# Patient Record
Sex: FEMALE | Race: WHITE | NOT HISPANIC OR LATINO | Employment: FULL TIME | URBAN - METROPOLITAN AREA
[De-identification: names, ages, dates, MRNs, and addresses within clinical notes are randomized per-mention and may not be internally consistent; named-entity substitution may affect disease eponyms.]

---

## 2023-10-27 ENCOUNTER — TELEPHONE (OUTPATIENT)
Dept: HEMATOLOGY/ONCOLOGY | Facility: CLINIC | Age: 58
End: 2023-10-27
Payer: COMMERCIAL

## 2023-11-01 PROBLEM — H52.4 BILATERAL PRESBYOPIA: Status: ACTIVE | Noted: 2023-11-01

## 2023-11-01 PROBLEM — M41.20 IDIOPATHIC SCOLIOSIS AND KYPHOSCOLIOSIS: Status: ACTIVE | Noted: 2023-11-01

## 2023-11-01 PROBLEM — M48.062 NEUROGENIC CLAUDICATION DUE TO LUMBAR SPINAL STENOSIS: Status: ACTIVE | Noted: 2023-11-01

## 2023-11-01 PROBLEM — E11.9 DIABETES MELLITUS TYPE 2 WITHOUT RETINOPATHY (MULTI): Status: ACTIVE | Noted: 2023-11-01

## 2023-11-01 PROBLEM — Q67.5 SCOLIOSIS, CONGENITAL: Status: ACTIVE | Noted: 2023-11-01

## 2023-11-01 PROBLEM — H52.13 BILATERAL MYOPIA: Status: ACTIVE | Noted: 2023-11-01

## 2023-11-01 PROBLEM — M43.10 SPONDYLOLISTHESIS, GRADE 1: Status: ACTIVE | Noted: 2023-11-01

## 2023-11-01 RX ORDER — GABAPENTIN 300 MG/1
300 CAPSULE ORAL 2 TIMES DAILY
COMMUNITY
Start: 2023-08-08

## 2023-11-01 RX ORDER — AZELASTINE HYDROCHLORIDE 0.5 MG/ML
SOLUTION/ DROPS OPHTHALMIC
COMMUNITY
Start: 2023-03-02 | End: 2023-11-30 | Stop reason: HOSPADM

## 2023-11-01 RX ORDER — POTASSIUM CHLORIDE 750 MG/1
10 TABLET, EXTENDED RELEASE ORAL DAILY
COMMUNITY
Start: 2023-08-08

## 2023-11-01 RX ORDER — DULOXETIN HYDROCHLORIDE 60 MG/1
60 CAPSULE, DELAYED RELEASE ORAL DAILY
COMMUNITY
Start: 2023-08-09

## 2023-11-01 RX ORDER — VALSARTAN AND HYDROCHLOROTHIAZIDE 160; 12.5 MG/1; MG/1
1 TABLET, FILM COATED ORAL DAILY
COMMUNITY
Start: 2023-08-15

## 2023-11-01 RX ORDER — BLOOD-GLUCOSE METER
KIT MISCELLANEOUS
COMMUNITY
Start: 2023-09-01

## 2023-11-01 RX ORDER — MONTELUKAST SODIUM 10 MG/1
10 TABLET ORAL NIGHTLY
Status: ON HOLD | COMMUNITY
Start: 2023-10-22 | End: 2023-11-30 | Stop reason: SDUPTHER

## 2023-11-01 RX ORDER — AMLODIPINE BESYLATE 10 MG/1
10 TABLET ORAL DAILY
COMMUNITY
Start: 2023-08-15

## 2023-11-01 RX ORDER — IBUPROFEN 800 MG/1
TABLET ORAL
Status: ON HOLD | COMMUNITY
Start: 2023-04-24 | End: 2023-11-30 | Stop reason: SDUPTHER

## 2023-11-01 RX ORDER — ZOLPIDEM TARTRATE 12.5 MG/1
12.5 TABLET, FILM COATED, EXTENDED RELEASE ORAL NIGHTLY PRN
COMMUNITY

## 2023-11-01 RX ORDER — SEMAGLUTIDE 2.68 MG/ML
2 INJECTION, SOLUTION SUBCUTANEOUS
COMMUNITY
Start: 2023-08-25

## 2023-11-02 ENCOUNTER — OFFICE VISIT (OUTPATIENT)
Dept: NEUROSURGERY | Facility: CLINIC | Age: 58
End: 2023-11-02
Payer: COMMERCIAL

## 2023-11-02 VITALS — HEART RATE: 90 BPM | SYSTOLIC BLOOD PRESSURE: 124 MMHG | TEMPERATURE: 97.7 F | DIASTOLIC BLOOD PRESSURE: 82 MMHG

## 2023-11-02 DIAGNOSIS — M48.062 NEUROGENIC CLAUDICATION DUE TO LUMBAR SPINAL STENOSIS: Primary | ICD-10-CM

## 2023-11-02 DIAGNOSIS — Q67.5 SCOLIOSIS, CONGENITAL: ICD-10-CM

## 2023-11-02 DIAGNOSIS — M41.20 IDIOPATHIC SCOLIOSIS AND KYPHOSCOLIOSIS: ICD-10-CM

## 2023-11-02 PROCEDURE — 99215 OFFICE O/P EST HI 40 MIN: CPT | Performed by: NEUROLOGICAL SURGERY

## 2023-11-02 PROCEDURE — 3079F DIAST BP 80-89 MM HG: CPT | Performed by: NEUROLOGICAL SURGERY

## 2023-11-02 PROCEDURE — 3074F SYST BP LT 130 MM HG: CPT | Performed by: NEUROLOGICAL SURGERY

## 2023-11-02 PROCEDURE — 1036F TOBACCO NON-USER: CPT | Performed by: NEUROLOGICAL SURGERY

## 2023-11-02 ASSESSMENT — PATIENT HEALTH QUESTIONNAIRE - PHQ9
2. FEELING DOWN, DEPRESSED OR HOPELESS: NOT AT ALL
1. LITTLE INTEREST OR PLEASURE IN DOING THINGS: SEVERAL DAYS
SUM OF ALL RESPONSES TO PHQ9 QUESTIONS 1 AND 2: 1

## 2023-11-02 ASSESSMENT — ENCOUNTER SYMPTOMS: OCCASIONAL FEELINGS OF UNSTEADINESS: 0

## 2023-11-02 ASSESSMENT — PAIN SCALES - GENERAL: PAINLEVEL: 7

## 2023-11-02 NOTE — PROGRESS NOTES
[facility] [place]  Department of Neurological Surgery  Established Patient Visit    Regardless of what the note states the patient's pain is predominantly on the right side and that is where most of the focus will occur at both L4-5 and L5-S1 but the new left-sided symptoms are coming from the L5-S1 and therefore we will be doing bilateral L5-S1 foraminotomies.    History of Present Illness  Katlyn Jeff is a 58 y.o. year old female who presents to the spine clinic in follow up with her new MRI of the lumbar spine to discuss her options for surgical intervention.  She has been complaining of significant right leg pain with walking and standing for quite some time.  We done therapy.  She is lost weight.  She has had injections.  It is getting to the point where she cannot take it any longer.  We have a CAT scan from 2021 which shows us that the disc osteophyte complexes on the MRI are in fact calcified.  Because her pain is predominantly on the right side but she does have some  numbness developing on the left my recommendation is to perform a decompressive lumbar laminectomy at L4-5 and L5-S1.  The decompression of L4 would go all the way up to identifying the takeoff of the L4 nerve roots and opening up the foramina especially on the left side.  At L5-S1 she has bilateral stenosis and the S1 level is worse on the left.  I went over the decompressive laminectomy with foraminotomies with her today.  I went over the operation with her in detail. We discussed risks,(these include but are not limited to - bleeding, infection, paralysis, muscle weakness, CSF leak, bowel or bladder dysfunction, incomplete resolution of pain or numbness, DVT/PE, heart attack, stroke, and other unforeseen medical and anesthesia complications) benefits, and outcome possibilities as well as the alternatives to this form of therapy. She understands and would like to proceed.    Patient's BMI is There is no height or weight  on file to calculate BMI.    14/14 systems reviewed and negative other than what is listed in the history of present illness    Patient Active Problem List   Diagnosis    Bilateral myopia    Bilateral presbyopia    Diabetes mellitus type 2 without retinopathy (CMS/HCC)    Neurogenic claudication due to lumbar spinal stenosis    Idiopathic scoliosis and kyphoscoliosis    Spondylolisthesis, grade 1    Scoliosis, congenital     Past Medical History:   Diagnosis Date    Personal history of other diseases of the circulatory system     History of hypertension    Personal history of other diseases of the musculoskeletal system and connective tissue     History of scoliosis    Personal history of other diseases of the musculoskeletal system and connective tissue     History of arthritis    Personal history of other endocrine, nutritional and metabolic disease     History of hypercholesterolemia    Personal history of other infectious and parasitic diseases     History of hepatitis C virus infection    Personal history of other mental and behavioral disorders     History of depression    Type 2 diabetes mellitus without complications (CMS/HCC)     Diabetes type 2, controlled     Past Surgical History:   Procedure Laterality Date    OTHER SURGICAL HISTORY  10/17/2022    Ankle surgery    OTHER SURGICAL HISTORY  10/17/2022    Cervical vertebral fusion     Social History     Tobacco Use    Smoking status: Never    Smokeless tobacco: Never   Substance Use Topics    Alcohol use: Yes     Comment: Rarely     family history includes Chronic primary angle-closure of right eye, moderate stage in her mother; Diabetes in her mother and another family member; Hypertension in her mother; Stomach cancer in her father, maternal grandmother, and paternal grandmother.    Current Outpatient Medications:     amLODIPine (Norvasc) 10 mg tablet, Take 1 tablet (10 mg) by mouth once daily., Disp: , Rfl:     azelastine (Optivar) 0.05 % ophthalmic  solution, , Disp: , Rfl:     DULoxetine (Cymbalta) 60 mg DR capsule, Take 1 capsule (60 mg) by mouth once daily., Disp: , Rfl:     FreeStyle Lite Strips strip, USE TO TEST EVERY DAY, Disp: , Rfl:     gabapentin (Neurontin) 300 mg capsule, Take 1 capsule (300 mg) by mouth 2 times a day., Disp: , Rfl:     ibuprofen 800 mg tablet, , Disp: , Rfl:     montelukast (Singulair) 10 mg tablet, Take 1 tablet (10 mg) by mouth once daily at bedtime., Disp: , Rfl:     Ozempic 2 mg/dose (8 mg/3 mL) pen injector, Inject 2 mg under the skin 1 (one) time per week., Disp: , Rfl:     potassium chloride CR 10 mEq ER tablet, Take 1 tablet (10 mEq) by mouth once daily., Disp: , Rfl:     valsartan-hydrochlorothiazide (Diovan-HCT) 160-12.5 mg tablet, 1 tablet once daily., Disp: , Rfl:     zolpidem CR (Ambien CR) 12.5 mg ER tablet, Take 1 tablet (12.5 mg) by mouth as needed at bedtime for sleep. Do not crush, chew, or split., Disp: , Rfl:   Allergies   Allergen Reactions    Adhesive Tape-Silicones Unknown    Aloe Vera Latex Gloves [Gloves, Latex With Aloe Vera] Unknown     Latex gloves     Codeine Unknown    Macrolide Antibiotics Unknown       Physical Examination:    Her gait today is nonantalgic and she has good strength in her legs including her anterior tibialis but her EHL is slightly weak on the right side.    Results:  I personally reviewed and interpreted the imaging results which included the new MRI shows that she has some lateral recess stenosis on the left side at L3-4 which we will not be addressing.  At L4-5 she has a large disc osteophyte complex underneath the thecal sac over to the right side and severe foraminal stenosis at L4.  The L5-S1 level have bilateral foraminal stenosis and the S1 nerve root is compressed more significantly on the left side    Assessment and Plan:      Katlyn Jeff is a 58 y.o. year old female who presents to the spine clinic in follow up with significant lumbar canal stenosis at L4-5 and L5-S1  secondary to degenerative changes associated with her scoliosis fusion from decades earlier.  She is gotten to the point where her therapy and her injections have stopped being effective and she would like to consider surgical intervention.  I am recommending a decompressive lumbar laminectomy at L4-5 and L5-S1 with foraminotomies and removal of any calcified disc that is readily removable.  I went over the operation with her in detail. We discussed risks,(these include but are not limited to - bleeding, infection, paralysis, muscle weakness, CSF leak, bowel or bladder dysfunction, incomplete resolution of pain or numbness, DVT/PE, heart attack, stroke, and other unforeseen medical and anesthesia complications) benefits, and outcome possibilities as well as the alternatives to this form of therapy. She understands and would like to proceed.      I have reviewed all prior documentation and reviewed the electronic medical record since admission. I have personally have reviewed all advanced imaging not just the reports and used my interpretation as documented as the relevant findings. I have reviewed the risks and benefits of all treatment recommendations listed in this note with the patient and family. I spent a total of 50 minutes in service to this patient's care during this date of service.      The above clinical summary has been dictated with voice recognition software. It has not been proofread for grammatical errors, typographical mistakes, or other semantic inconsistencies.    Thank you for visiting our office today. It was our pleasure to take part in your healthcare.     Do not hesitate to call with any questions regarding your plan of care after leaving. My office can be reached at [phone] M-F 8am-4pm.     To clinicians, thank you very much for this kind referral. It is a privilege to partner with you in the care of your patients. My office would be delighted to assist you with any further  consultations or with questions regarding the plan of care outlined. Do not hesitate to call the office or contact me directly.     Sincerely,    Saji Echavarria MD, FAANS, FACS  Board Certified Neurological Surgeon  , Department of Neurological Surgery  [employer]    [facility]  [address]  [address] [facility] [address]  [place] [address]    [facility]  [address]  [address]  [place] [address]    Phone: [phone]  Fax: [phone]

## 2023-11-02 NOTE — H&P (VIEW-ONLY)
[facility] [place]  Department of Neurological Surgery  Established Patient Visit    Regardless of what the note states the patient's pain is predominantly on the right side and that is where most of the focus will occur at both L4-5 and L5-S1 but the new left-sided symptoms are coming from the L5-S1 and therefore we will be doing bilateral L5-S1 foraminotomies.    History of Present Illness  Katlyn Jeff is a 58 y.o. year old female who presents to the spine clinic in follow up with her new MRI of the lumbar spine to discuss her options for surgical intervention.  She has been complaining of significant right leg pain with walking and standing for quite some time.  We done therapy.  She is lost weight.  She has had injections.  It is getting to the point where she cannot take it any longer.  We have a CAT scan from 2021 which shows us that the disc osteophyte complexes on the MRI are in fact calcified.  Because her pain is predominantly on the right side but she does have some  numbness developing on the left my recommendation is to perform a decompressive lumbar laminectomy at L4-5 and L5-S1.  The decompression of L4 would go all the way up to identifying the takeoff of the L4 nerve roots and opening up the foramina especially on the left side.  At L5-S1 she has bilateral stenosis and the S1 level is worse on the left.  I went over the decompressive laminectomy with foraminotomies with her today.  I went over the operation with her in detail. We discussed risks,(these include but are not limited to - bleeding, infection, paralysis, muscle weakness, CSF leak, bowel or bladder dysfunction, incomplete resolution of pain or numbness, DVT/PE, heart attack, stroke, and other unforeseen medical and anesthesia complications) benefits, and outcome possibilities as well as the alternatives to this form of therapy. She understands and would like to proceed.    Patient's BMI is There is no height or weight  on file to calculate BMI.    14/14 systems reviewed and negative other than what is listed in the history of present illness    Patient Active Problem List   Diagnosis    Bilateral myopia    Bilateral presbyopia    Diabetes mellitus type 2 without retinopathy (CMS/HCC)    Neurogenic claudication due to lumbar spinal stenosis    Idiopathic scoliosis and kyphoscoliosis    Spondylolisthesis, grade 1    Scoliosis, congenital     Past Medical History:   Diagnosis Date    Personal history of other diseases of the circulatory system     History of hypertension    Personal history of other diseases of the musculoskeletal system and connective tissue     History of scoliosis    Personal history of other diseases of the musculoskeletal system and connective tissue     History of arthritis    Personal history of other endocrine, nutritional and metabolic disease     History of hypercholesterolemia    Personal history of other infectious and parasitic diseases     History of hepatitis C virus infection    Personal history of other mental and behavioral disorders     History of depression    Type 2 diabetes mellitus without complications (CMS/HCC)     Diabetes type 2, controlled     Past Surgical History:   Procedure Laterality Date    OTHER SURGICAL HISTORY  10/17/2022    Ankle surgery    OTHER SURGICAL HISTORY  10/17/2022    Cervical vertebral fusion     Social History     Tobacco Use    Smoking status: Never    Smokeless tobacco: Never   Substance Use Topics    Alcohol use: Yes     Comment: Rarely     family history includes Chronic primary angle-closure of right eye, moderate stage in her mother; Diabetes in her mother and another family member; Hypertension in her mother; Stomach cancer in her father, maternal grandmother, and paternal grandmother.    Current Outpatient Medications:     amLODIPine (Norvasc) 10 mg tablet, Take 1 tablet (10 mg) by mouth once daily., Disp: , Rfl:     azelastine (Optivar) 0.05 % ophthalmic  solution, , Disp: , Rfl:     DULoxetine (Cymbalta) 60 mg DR capsule, Take 1 capsule (60 mg) by mouth once daily., Disp: , Rfl:     FreeStyle Lite Strips strip, USE TO TEST EVERY DAY, Disp: , Rfl:     gabapentin (Neurontin) 300 mg capsule, Take 1 capsule (300 mg) by mouth 2 times a day., Disp: , Rfl:     ibuprofen 800 mg tablet, , Disp: , Rfl:     montelukast (Singulair) 10 mg tablet, Take 1 tablet (10 mg) by mouth once daily at bedtime., Disp: , Rfl:     Ozempic 2 mg/dose (8 mg/3 mL) pen injector, Inject 2 mg under the skin 1 (one) time per week., Disp: , Rfl:     potassium chloride CR 10 mEq ER tablet, Take 1 tablet (10 mEq) by mouth once daily., Disp: , Rfl:     valsartan-hydrochlorothiazide (Diovan-HCT) 160-12.5 mg tablet, 1 tablet once daily., Disp: , Rfl:     zolpidem CR (Ambien CR) 12.5 mg ER tablet, Take 1 tablet (12.5 mg) by mouth as needed at bedtime for sleep. Do not crush, chew, or split., Disp: , Rfl:   Allergies   Allergen Reactions    Adhesive Tape-Silicones Unknown    Aloe Vera Latex Gloves [Gloves, Latex With Aloe Vera] Unknown     Latex gloves     Codeine Unknown    Macrolide Antibiotics Unknown       Physical Examination:    Her gait today is nonantalgic and she has good strength in her legs including her anterior tibialis but her EHL is slightly weak on the right side.    Results:  I personally reviewed and interpreted the imaging results which included the new MRI shows that she has some lateral recess stenosis on the left side at L3-4 which we will not be addressing.  At L4-5 she has a large disc osteophyte complex underneath the thecal sac over to the right side and severe foraminal stenosis at L4.  The L5-S1 level have bilateral foraminal stenosis and the S1 nerve root is compressed more significantly on the left side    Assessment and Plan:      Katlyn Jeff is a 58 y.o. year old female who presents to the spine clinic in follow up with significant lumbar canal stenosis at L4-5 and L5-S1  secondary to degenerative changes associated with her scoliosis fusion from decades earlier.  She is gotten to the point where her therapy and her injections have stopped being effective and she would like to consider surgical intervention.  I am recommending a decompressive lumbar laminectomy at L4-5 and L5-S1 with foraminotomies and removal of any calcified disc that is readily removable.  I went over the operation with her in detail. We discussed risks,(these include but are not limited to - bleeding, infection, paralysis, muscle weakness, CSF leak, bowel or bladder dysfunction, incomplete resolution of pain or numbness, DVT/PE, heart attack, stroke, and other unforeseen medical and anesthesia complications) benefits, and outcome possibilities as well as the alternatives to this form of therapy. She understands and would like to proceed.      I have reviewed all prior documentation and reviewed the electronic medical record since admission. I have personally have reviewed all advanced imaging not just the reports and used my interpretation as documented as the relevant findings. I have reviewed the risks and benefits of all treatment recommendations listed in this note with the patient and family. I spent a total of 50 minutes in service to this patient's care during this date of service.      The above clinical summary has been dictated with voice recognition software. It has not been proofread for grammatical errors, typographical mistakes, or other semantic inconsistencies.    Thank you for visiting our office today. It was our pleasure to take part in your healthcare.     Do not hesitate to call with any questions regarding your plan of care after leaving. My office can be reached at [phone] M-F 8am-4pm.     To clinicians, thank you very much for this kind referral. It is a privilege to partner with you in the care of your patients. My office would be delighted to assist you with any further  consultations or with questions regarding the plan of care outlined. Do not hesitate to call the office or contact me directly.     Sincerely,    Saji Echavarria MD, FAANS, FACS  Board Certified Neurological Surgeon  , Department of Neurological Surgery  [facility]    [facility]  [address]  [address] [facility] [address]  [place] [address]    [facility]  [address]  [address]  [place] [address]    Phone: [phone]  Fax: [phone]

## 2023-11-03 DIAGNOSIS — M48.062 NEUROGENIC CLAUDICATION DUE TO LUMBAR SPINAL STENOSIS: Primary | ICD-10-CM

## 2023-11-03 RX ORDER — DEXTROSE, SODIUM CHLORIDE, SODIUM LACTATE, POTASSIUM CHLORIDE, AND CALCIUM CHLORIDE 5; .6; .31; .03; .02 G/100ML; G/100ML; G/100ML; G/100ML; G/100ML
100 INJECTION, SOLUTION INTRAVENOUS CONTINUOUS
Status: CANCELLED | OUTPATIENT
Start: 2023-11-03

## 2023-11-22 ENCOUNTER — HOSPITAL ENCOUNTER (OUTPATIENT)
Dept: RADIOLOGY | Facility: HOSPITAL | Age: 58
Discharge: HOME | End: 2023-11-22
Payer: COMMERCIAL

## 2023-11-22 ENCOUNTER — PRE-ADMISSION TESTING (OUTPATIENT)
Dept: PREADMISSION TESTING | Facility: HOSPITAL | Age: 58
End: 2023-11-22
Payer: COMMERCIAL

## 2023-11-22 VITALS
DIASTOLIC BLOOD PRESSURE: 75 MMHG | OXYGEN SATURATION: 98 % | HEART RATE: 99 BPM | WEIGHT: 182.98 LBS | HEIGHT: 63 IN | SYSTOLIC BLOOD PRESSURE: 107 MMHG | TEMPERATURE: 97 F | BODY MASS INDEX: 32.42 KG/M2 | RESPIRATION RATE: 16 BRPM

## 2023-11-22 DIAGNOSIS — M48.062 NEUROGENIC CLAUDICATION DUE TO LUMBAR SPINAL STENOSIS: ICD-10-CM

## 2023-11-22 LAB
ANION GAP SERPL CALC-SCNC: 15 MMOL/L (ref 10–20)
APTT PPP: 34 SECONDS (ref 27–38)
BUN SERPL-MCNC: 18 MG/DL (ref 6–23)
CALCIUM SERPL-MCNC: 10.2 MG/DL (ref 8.6–10.3)
CHLORIDE SERPL-SCNC: 100 MMOL/L (ref 98–107)
CO2 SERPL-SCNC: 28 MMOL/L (ref 21–32)
CREAT SERPL-MCNC: 0.97 MG/DL (ref 0.5–1.05)
ERYTHROCYTE [DISTWIDTH] IN BLOOD BY AUTOMATED COUNT: 12.3 % (ref 11.5–14.5)
GFR SERPL CREATININE-BSD FRML MDRD: 67 ML/MIN/1.73M*2
GLUCOSE SERPL-MCNC: 108 MG/DL (ref 74–99)
HCT VFR BLD AUTO: 43.8 % (ref 36–46)
HGB BLD-MCNC: 14.4 G/DL (ref 12–16)
INR PPP: 1 (ref 0.9–1.1)
MCH RBC QN AUTO: 31 PG (ref 26–34)
MCHC RBC AUTO-ENTMCNC: 32.9 G/DL (ref 32–36)
MCV RBC AUTO: 94 FL (ref 80–100)
NRBC BLD-RTO: 0 /100 WBCS (ref 0–0)
PLATELET # BLD AUTO: 387 X10*3/UL (ref 150–450)
POTASSIUM SERPL-SCNC: 4.9 MMOL/L (ref 3.5–5.3)
PROTHROMBIN TIME: 11.7 SECONDS (ref 9.8–12.8)
RBC # BLD AUTO: 4.64 X10*6/UL (ref 4–5.2)
SODIUM SERPL-SCNC: 138 MMOL/L (ref 136–145)
WBC # BLD AUTO: 7.4 X10*3/UL (ref 4.4–11.3)

## 2023-11-22 PROCEDURE — 87081 CULTURE SCREEN ONLY: CPT | Mod: PARLAB | Performed by: NURSE PRACTITIONER

## 2023-11-22 PROCEDURE — 80048 BASIC METABOLIC PNL TOTAL CA: CPT | Performed by: NEUROLOGICAL SURGERY

## 2023-11-22 PROCEDURE — 93010 ELECTROCARDIOGRAM REPORT: CPT | Performed by: INTERNAL MEDICINE

## 2023-11-22 PROCEDURE — 85027 COMPLETE CBC AUTOMATED: CPT | Performed by: NEUROLOGICAL SURGERY

## 2023-11-22 PROCEDURE — 36415 COLL VENOUS BLD VENIPUNCTURE: CPT | Performed by: NEUROLOGICAL SURGERY

## 2023-11-22 PROCEDURE — 85610 PROTHROMBIN TIME: CPT | Performed by: NEUROLOGICAL SURGERY

## 2023-11-22 PROCEDURE — 93005 ELECTROCARDIOGRAM TRACING: CPT

## 2023-11-22 PROCEDURE — 71046 X-RAY EXAM CHEST 2 VIEWS: CPT | Performed by: RADIOLOGY

## 2023-11-22 PROCEDURE — 71046 X-RAY EXAM CHEST 2 VIEWS: CPT

## 2023-11-22 PROCEDURE — 99203 OFFICE O/P NEW LOW 30 MIN: CPT | Performed by: NURSE PRACTITIONER

## 2023-11-22 ASSESSMENT — DUKE ACTIVITY SCORE INDEX (DASI)
CAN YOU TAKE CARE OF YOURSELF (EAT, DRESS, BATHE, OR USE TOILET): YES
CAN YOU DO LIGHT WORK AROUND THE HOUSE LIKE DUSTING OR WASHING DISHES: YES
CAN YOU DO YARD WORK LIKE RAKING LEAVES, WEEDING OR PUSHING A MOWER: NO
CAN YOU CLIMB A FLIGHT OF STAIRS OR WALK UP A HILL: YES
CAN YOU WALK A BLOCK OR TWO ON LEVEL GROUND: YES
CAN YOU PARTICIPATE IN MODERATE RECREATIONAL ACTIVITIES LIKE GOLF, BOWLING, DANCING, DOUBLES TENNIS OR THROWING A BASEBALL OR FOOTBALL: NO
CAN YOU DO HEAVY WORK AROUND THE HOUSE LIKE SCRUBBING FLOORS OR LIFTING AND MOVING HEAVY FURNITURE: NO
CAN YOU DO MODERATE WORK AROUND THE HOUSE LIKE VACUUMING, SWEEPING FLOORS OR CARRYING GROCERIES: YES
CAN YOU HAVE SEXUAL RELATIONS: YES
TOTAL_SCORE: 24.2
DASI METS SCORE: 5.7
CAN YOU PARTICIPATE IN STRENOUS SPORTS LIKE SWIMMING, SINGLES TENNIS, FOOTBALL, BASKETBALL, OR SKIING: NO
CAN YOU WALK INDOORS, SUCH AS AROUND YOUR HOUSE: YES
CAN YOU RUN A SHORT DISTANCE: NO

## 2023-11-22 NOTE — H&P (VIEW-ONLY)
CPM/PAT Evaluation       Name: Katlyn Jeff (Katlyn Jeff)  /Age: 1965/58 y.o.     In-Person       Chief Complaint: Spinal stenosis    58 yr old female with c/o lower back pain.  Reports ongoing progressive pain worsened by activity throughout the day including walking, standing and sitting in chair.  Pain is constant ache radiating from lower back down Right leg with varying of intensity; denies numbness/tingling or related falls.  Has tried injections, stretches, water aerobics with no lasting relief.  Reports remaining otherwise physically active as much as can be, denies cardiac or respiratory symptoms.  Reports hx of post-op tremors. Reports hardware (two Blanco rods) in spine from prior surgery.         Past Medical History:   Diagnosis Date    Arthritis     Dysfunctional uterine bleeding     GERD (gastroesophageal reflux disease)     Hepatitis C     History of blood transfusion     HPV in female     Hypertension     Nephrolithiasis     Personal history of other diseases of the circulatory system     History of hypertension    Personal history of other diseases of the musculoskeletal system and connective tissue     History of scoliosis    Personal history of other diseases of the musculoskeletal system and connective tissue     History of arthritis    Personal history of other endocrine, nutritional and metabolic disease     History of hypercholesterolemia    Personal history of other infectious and parasitic diseases     History of hepatitis C virus infection    Personal history of other mental and behavioral disorders     History of depression    Scoliosis     Type 2 diabetes mellitus without complications (CMS/HCC)     Diabetes type 2, controlled    Vision loss        Past Surgical History:   Procedure Laterality Date    CHOLECYSTECTOMY      COLONOSCOPY      OTHER SURGICAL HISTORY  10/17/2022    Ankle surgery    OTHER SURGICAL HISTORY  10/17/2022    Cervical vertebral fusion    SPINAL FUSION       TONSILLECTOMY      UPPER GASTROINTESTINAL ENDOSCOPY         Patient  has no history on file for sexual activity.    Family History   Problem Relation Name Age of Onset    Other (Chronic primary angle-closure of right eye, moderate stage) Mother      Diabetes Mother      Hypertension Mother      Stomach cancer Father      Stomach cancer Maternal Grandmother      Stomach cancer Paternal Grandmother      Diabetes Other Maternal relatives        Allergies   Allergen Reactions    Adhesive Tape-Silicones Unknown    Aloe Vera Latex Gloves [Gloves, Latex With Aloe Vera] Unknown     Latex gloves     Codeine Unknown    Macrolide Antibiotics Unknown       Prior to Admission medications    Medication Sig Start Date End Date Taking? Authorizing Provider   amLODIPine (Norvasc) 10 mg tablet Take 1 tablet (10 mg) by mouth once daily. 8/15/23  Yes Historical Provider, MD   DULoxetine (Cymbalta) 60 mg DR capsule Take 1 capsule (60 mg) by mouth once daily. 8/9/23  Yes Historical Provider, MD   gabapentin (Neurontin) 300 mg capsule Take 1 capsule (300 mg) by mouth 2 times a day. 8/8/23  Yes Historical Provider, MD   ibuprofen 800 mg tablet  4/24/23  Yes Historical Provider, MD   montelukast (Singulair) 10 mg tablet Take 1 tablet (10 mg) by mouth once daily at bedtime. 10/22/23  Yes Historical Provider, MD   Ozempic 2 mg/dose (8 mg/3 mL) pen injector Inject 2 mg under the skin 1 (one) time per week. 8/25/23  Yes Historical Provider, MD   potassium chloride CR 10 mEq ER tablet Take 1 tablet (10 mEq) by mouth once daily. 8/8/23  Yes Historical Provider, MD   valsartan-hydrochlorothiazide (Diovan-HCT) 160-12.5 mg tablet 1 tablet once daily. 8/15/23  Yes Historical Provider, MD   zolpidem CR (Ambien CR) 12.5 mg ER tablet Take 1 tablet (12.5 mg) by mouth as needed at bedtime for sleep. Do not crush, chew, or split.   Yes Historical Provider, MD   azelastine (Optivar) 0.05 % ophthalmic solution  3/2/23   Historical Provider, MD    FreeStyle Lite Strips strip USE TO TEST EVERY DAY 9/1/23   Historical Provider, MD        Review of Systems  Constitutional: no fever, no chills and not feeling poorly.   Eyes: no eyesight problems.   ENT: some buzzing in ears; no hearing loss, no nosebleeds and no sore throat.   Cardiovascular: no chest pain, no palpitations and no extremity edema.   Respiratory: no shortness of breath, no wheezing, no cough and no shortness of breath during exertion.   Gastrointestinal: no abdominal pain, no constipation, no heartburn, no diarrhea, no vomiting and no blood in stools.   Genitourinary: no dysuria, no incontinence, normal micturition and no testicular pain.   Musculoskeletal: no arthralgias and no gait abnormality.   Integumentary: no skin lesions, no skin wound and no change in a mole.   Neurological: no confusion, no dizziness, no fainting and no difficulty walking.   Psychiatric: not suicidal, no anxiety and no depression.   All other systems have been reviewed and are negative for complaint.     Physical Exam  Constitutional:       General: No acute distress.     Aox3, pleasant and cooperative, appropriate mood and eye contact   HENT:      Head: Normocephalic.      Mouth/Throat: Mucous membranes moist & pink  Eyes:      Vision grossly intact, PERRLA, corrective lenses in use   Neck:      No carotid bruit, no JVD  Cardiovascular:      RRR, S1S2, no murmurs, rubs or gallops  Pulmonary:      Symmetric chest expansion, CTA, Room Air  Abdominal:      Soft non-tender, BSx4   Skin:     Warm, dry & intact   Extremities:      No gross deformities; normal gait   Neurological:      No focal deficit, Aox3, JOHNSON x4  Psychiatric:      Pleasant & cooperative, appropriate affect    PAT AIRWAY:   Airway:     Mallampati::  I    TM distance::  <3 FB    Neck ROM::  Limited   Top front veneers  Crowns in back bottom      Visit Vitals  /75   Pulse 99   Temp 36.1 °C (97 °F)   Resp 16       DASI Risk Score      Flowsheet Row Most  Recent Value   DASI SCORE 24.2   METS Score (Will be calculated only when all the questions are answered) 5.7          Caprini DVT Assessment    No data to display       Modified Frailty Index    No data to display       CHADS2 Stroke Risk  Current as of 14 minutes ago        N/A 3 - 100%: High Risk   2 - 3%: Medium Risk   0 - 2%: Low Risk     Last Change: N/A          This score determines the patient's risk of having a stroke if the patient has atrial fibrillation.        This score is not applicable to this patient. Components are not calculated.          Revised Cardiac Risk Index    No data to display       Apfel Simplified Score    No data to display       Risk Analysis Index Results This Encounter    No data found in the last 1 encounters.       Stop Bang Score      Flowsheet Row Most Recent Value   Do you snore loudly? 0   Do you often feel tired or fatigued after your sleep? 0   Has anyone ever observed you stop breathing in your sleep? 0   Do you have or are you being treated for high blood pressure? 1   Recent BMI (Calculated) 32.4   Is BMI greater than 35 kg/m2? 0=No   Age older than 50 years old? 1=Yes   Is your neck circumference greater than 17 inches (Male) or 16 inches (Female)? 1   Gender - Male 0=No   STOP-BANG Total Score 3            Assessment and Plan:     Followed by neurosurgery, neurogenic claudication d/t spinal stenosis    L4-5, L-5, S-1 decompressive lumbar laminectomy w/flat plate x-ray w/Dr Echavarria on 11/29/2023

## 2023-11-22 NOTE — PREPROCEDURE INSTRUCTIONS
Medication List            Accurate as of November 22, 2023 11:25 AM. Always use your most recent med list.                amLODIPine 10 mg tablet  Commonly known as: Norvasc  Medication Adjustments for Surgery: Take morning of surgery with sip of water, no other fluids     azelastine 0.05 % ophthalmic solution  Commonly known as: Optivar     DULoxetine 60 mg DR capsule  Commonly known as: Cymbalta     FreeStyle Lite Strips strip  Generic drug: blood sugar diagnostic     gabapentin 300 mg capsule  Commonly known as: Neurontin     ibuprofen 800 mg tablet  Medication Adjustments for Surgery: Stop 7 days before surgery     montelukast 10 mg tablet  Commonly known as: Singulair     Ozempic 2 mg/dose (8 mg/3 mL) pen injector  Generic drug: semaglutide  Medication Adjustments for Surgery: Other (Comment)     potassium chloride CR 10 mEq ER tablet  Commonly known as: Klor-Con     valsartan-hydrochlorothiazide 160-12.5 mg tablet  Commonly known as: Diovan-HCT  Medication Adjustments for Surgery: Take morning of surgery with sip of water, no other fluids     zolpidem CR 12.5 mg ER tablet  Commonly known as: Ambien CR          ERSA KIT AND INSTRUCTIONS PROVIDED    One of our staff members will call you one business day before your surgery between 12-4pm to let you know the time to arrive at the hospital. If you have not received a phone call by 2pm call [phone].     When you arrive at the hospital, go to Registration on the ground floor.   You will need a responsible adult to drive you home.     Prior to surgery date:  One (1) week prior to surgery STOP:  -Stop aspirin products. Do not take NSAIDS/ Ibuprofen, Aleve, Motrin. Okay to take Tylenol or Acetaminophen. Do not take vitamins, supplements, herbals, diet pills.   -Stop these medications: __________________________________________________________  -No alcohol for 24 hours prior to surgery.  -No smoking 24 hours prior. No Marijuana, CBD oil, or Vaping 48 hours  prior to surgery.  -Enjoy a light supper the evening before surgery.    Day of Surgery:  -Nothing to eat or drink after midnight. This includes food of any kind (including hard candy, cough drops, mints and gum, coffee).   -No acrylic nails or nail polish on at least one fingernail; no polish on toes for foot surgery.   -No body piercing or jewelry.   -Shower as directed. No deodorant, lotion, power, oils, perfume, make-up.   -Wear loose comfortable clothing to accommodate your bandages.     -Take the following medication with a very small amount of water the morning of surgery:  TAKE VALSARTAN AND AMLODIPINE WITH SIPS OF WATER.     -Bring crutches/ walker if directed        NPO Instructions:    Do not eat any food after midnight the night before your surgery/procedure.    Additional Instructions:     You will be contacted regarding the time of your arrival to facility and surgery time

## 2023-11-24 LAB — STAPHYLOCOCCUS SPEC CULT: ABNORMAL

## 2023-11-25 LAB
ATRIAL RATE: 91 BPM
P AXIS: 24 DEGREES
P OFFSET: 214 MS
P ONSET: 167 MS
PR INTERVAL: 118 MS
Q ONSET: 226 MS
QRS COUNT: 14 BEATS
QRS DURATION: 92 MS
QT INTERVAL: 368 MS
QTC CALCULATION(BAZETT): 452 MS
QTC FREDERICIA: 423 MS
R AXIS: 39 DEGREES
T AXIS: 41 DEGREES
T OFFSET: 410 MS
VENTRICULAR RATE: 91 BPM

## 2023-11-29 ENCOUNTER — ANESTHESIA (OUTPATIENT)
Dept: OPERATING ROOM | Facility: HOSPITAL | Age: 58
End: 2023-11-29
Payer: COMMERCIAL

## 2023-11-29 ENCOUNTER — APPOINTMENT (OUTPATIENT)
Dept: RADIOLOGY | Facility: HOSPITAL | Age: 58
End: 2023-11-29
Payer: COMMERCIAL

## 2023-11-29 ENCOUNTER — HOSPITAL ENCOUNTER (OUTPATIENT)
Facility: HOSPITAL | Age: 58
Discharge: HOME | End: 2023-11-30
Attending: NEUROLOGICAL SURGERY | Admitting: NEUROLOGICAL SURGERY
Payer: COMMERCIAL

## 2023-11-29 ENCOUNTER — ANESTHESIA EVENT (OUTPATIENT)
Dept: OPERATING ROOM | Facility: HOSPITAL | Age: 58
End: 2023-11-29
Payer: COMMERCIAL

## 2023-11-29 DIAGNOSIS — M48.062 NEUROGENIC CLAUDICATION DUE TO LUMBAR SPINAL STENOSIS: ICD-10-CM

## 2023-11-29 DIAGNOSIS — Z98.890 HISTORY OF GENERAL ANESTHESIA: ICD-10-CM

## 2023-11-29 DIAGNOSIS — Z98.890 HISTORY OF EXCISION OF LAMINA OF LUMBAR VERTEBRA FOR DECOMPRESSION OF SPINAL CORD: ICD-10-CM

## 2023-11-29 DIAGNOSIS — Z01.818 PRE-OP TESTING: Primary | ICD-10-CM

## 2023-11-29 DIAGNOSIS — G89.18 ACUTE POST-OPERATIVE PAIN: ICD-10-CM

## 2023-11-29 LAB
ABO GROUP (TYPE) IN BLOOD: NORMAL
ABO GROUP (TYPE) IN BLOOD: NORMAL
ALBUMIN SERPL BCP-MCNC: 4.1 G/DL (ref 3.4–5)
ALP SERPL-CCNC: 53 U/L (ref 33–136)
ALT SERPL W P-5'-P-CCNC: 10 U/L (ref 7–45)
ANION GAP SERPL CALC-SCNC: 13 MMOL/L (ref 10–20)
ANTIBODY SCREEN: NORMAL
AST SERPL W P-5'-P-CCNC: 15 U/L (ref 9–39)
BILIRUB SERPL-MCNC: 0.7 MG/DL (ref 0–1.2)
BUN SERPL-MCNC: 19 MG/DL (ref 6–23)
CALCIUM SERPL-MCNC: 9.4 MG/DL (ref 8.6–10.3)
CHLORIDE SERPL-SCNC: 103 MMOL/L (ref 98–107)
CO2 SERPL-SCNC: 25 MMOL/L (ref 21–32)
CREAT SERPL-MCNC: 0.73 MG/DL (ref 0.5–1.05)
ERYTHROCYTE [DISTWIDTH] IN BLOOD BY AUTOMATED COUNT: 12.1 % (ref 11.5–14.5)
GFR SERPL CREATININE-BSD FRML MDRD: >90 ML/MIN/1.73M*2
GLUCOSE BLD MANUAL STRIP-MCNC: 118 MG/DL (ref 74–99)
GLUCOSE BLD MANUAL STRIP-MCNC: 137 MG/DL (ref 74–99)
GLUCOSE BLD MANUAL STRIP-MCNC: 142 MG/DL (ref 74–99)
GLUCOSE SERPL-MCNC: 154 MG/DL (ref 74–99)
HCT VFR BLD AUTO: 40.9 % (ref 36–46)
HGB BLD-MCNC: 13.5 G/DL (ref 12–16)
MCH RBC QN AUTO: 31.3 PG (ref 26–34)
MCHC RBC AUTO-ENTMCNC: 33 G/DL (ref 32–36)
MCV RBC AUTO: 95 FL (ref 80–100)
NRBC BLD-RTO: 0 /100 WBCS (ref 0–0)
PLATELET # BLD AUTO: 304 X10*3/UL (ref 150–450)
POTASSIUM SERPL-SCNC: 4 MMOL/L (ref 3.5–5.3)
PROT SERPL-MCNC: 6.4 G/DL (ref 6.4–8.2)
RBC # BLD AUTO: 4.32 X10*6/UL (ref 4–5.2)
RH FACTOR (ANTIGEN D): NORMAL
RH FACTOR (ANTIGEN D): NORMAL
SODIUM SERPL-SCNC: 137 MMOL/L (ref 136–145)
WBC # BLD AUTO: 9.7 X10*3/UL (ref 4.4–11.3)

## 2023-11-29 PROCEDURE — 7100000011 HC EXTENDED STAY RECOVERY HOURLY - NURSING UNIT

## 2023-11-29 PROCEDURE — 2500000004 HC RX 250 GENERAL PHARMACY W/ HCPCS (ALT 636 FOR OP/ED): Performed by: ANESTHESIOLOGY

## 2023-11-29 PROCEDURE — 36620 INSERTION CATHETER ARTERY: CPT | Performed by: NURSE ANESTHETIST, CERTIFIED REGISTERED

## 2023-11-29 PROCEDURE — A4217 STERILE WATER/SALINE, 500 ML: HCPCS

## 2023-11-29 PROCEDURE — 36415 COLL VENOUS BLD VENIPUNCTURE: CPT | Performed by: ANESTHESIOLOGY

## 2023-11-29 PROCEDURE — C1751 CATH, INF, PER/CENT/MIDLINE: HCPCS | Performed by: NEUROLOGICAL SURGERY

## 2023-11-29 PROCEDURE — 2500000004 HC RX 250 GENERAL PHARMACY W/ HCPCS (ALT 636 FOR OP/ED): Performed by: NURSE ANESTHETIST, CERTIFIED REGISTERED

## 2023-11-29 PROCEDURE — 2500000001 HC RX 250 WO HCPCS SELF ADMINISTERED DRUGS (ALT 637 FOR MEDICARE OP): Performed by: NEUROLOGICAL SURGERY

## 2023-11-29 PROCEDURE — 80053 COMPREHEN METABOLIC PANEL: CPT | Performed by: INTERNAL MEDICINE

## 2023-11-29 PROCEDURE — 2780000003 HC OR 278 NO HCPCS: Performed by: NEUROLOGICAL SURGERY

## 2023-11-29 PROCEDURE — 3700000002 HC GENERAL ANESTHESIA TIME - EACH INCREMENTAL 1 MINUTE: Performed by: NEUROLOGICAL SURGERY

## 2023-11-29 PROCEDURE — 2500000004 HC RX 250 GENERAL PHARMACY W/ HCPCS (ALT 636 FOR OP/ED)

## 2023-11-29 PROCEDURE — A63047 PR LAMINEC/FACETECT/FORAMIN,LUMBAR 1 SEG: Performed by: ANESTHESIOLOGY

## 2023-11-29 PROCEDURE — 7100000002 HC RECOVERY ROOM TIME - EACH INCREMENTAL 1 MINUTE: Performed by: NEUROLOGICAL SURGERY

## 2023-11-29 PROCEDURE — 2500000001 HC RX 250 WO HCPCS SELF ADMINISTERED DRUGS (ALT 637 FOR MEDICARE OP): Performed by: ANESTHESIOLOGY

## 2023-11-29 PROCEDURE — 86901 BLOOD TYPING SEROLOGIC RH(D): CPT | Performed by: ANESTHESIOLOGY

## 2023-11-29 PROCEDURE — 2500000005 HC RX 250 GENERAL PHARMACY W/O HCPCS: Performed by: NURSE ANESTHETIST, CERTIFIED REGISTERED

## 2023-11-29 PROCEDURE — 85027 COMPLETE CBC AUTOMATED: CPT | Performed by: INTERNAL MEDICINE

## 2023-11-29 PROCEDURE — 99221 1ST HOSP IP/OBS SF/LOW 40: CPT | Performed by: INTERNAL MEDICINE

## 2023-11-29 PROCEDURE — 7100000001 HC RECOVERY ROOM TIME - INITIAL BASE CHARGE: Performed by: NEUROLOGICAL SURGERY

## 2023-11-29 PROCEDURE — 72020 X-RAY EXAM OF SPINE 1 VIEW
CPT | Mod: REPEAT PROCEDURE BY SAME PHYSICIAN | Performed by: STUDENT IN AN ORGANIZED HEALTH CARE EDUCATION/TRAINING PROGRAM

## 2023-11-29 PROCEDURE — 36415 COLL VENOUS BLD VENIPUNCTURE: CPT | Performed by: NEUROLOGICAL SURGERY

## 2023-11-29 PROCEDURE — 72020 X-RAY EXAM OF SPINE 1 VIEW: CPT | Mod: 76,FY

## 2023-11-29 PROCEDURE — A63047 PR LAMINEC/FACETECT/FORAMIN,LUMBAR 1 SEG: Performed by: NURSE ANESTHETIST, CERTIFIED REGISTERED

## 2023-11-29 PROCEDURE — 3700000001 HC GENERAL ANESTHESIA TIME - INITIAL BASE CHARGE: Performed by: NEUROLOGICAL SURGERY

## 2023-11-29 PROCEDURE — 63047 LAM FACETEC & FORAMOT LUMBAR: CPT | Performed by: NEUROLOGICAL SURGERY

## 2023-11-29 PROCEDURE — 2500000004 HC RX 250 GENERAL PHARMACY W/ HCPCS (ALT 636 FOR OP/ED): Performed by: NEUROLOGICAL SURGERY

## 2023-11-29 PROCEDURE — A4217 STERILE WATER/SALINE, 500 ML: HCPCS | Performed by: NEUROLOGICAL SURGERY

## 2023-11-29 PROCEDURE — 82947 ASSAY GLUCOSE BLOOD QUANT: CPT | Mod: 59

## 2023-11-29 PROCEDURE — 72020 X-RAY EXAM OF SPINE 1 VIEW: CPT | Mod: FY

## 2023-11-29 PROCEDURE — 63048 LAM FACETEC &FORAMOT EA ADDL: CPT | Performed by: NEUROLOGICAL SURGERY

## 2023-11-29 PROCEDURE — 3600000009 HC OR TIME - EACH INCREMENTAL 1 MINUTE - PROCEDURE LEVEL FOUR: Performed by: NEUROLOGICAL SURGERY

## 2023-11-29 PROCEDURE — 2720000007 HC OR 272 NO HCPCS: Performed by: NEUROLOGICAL SURGERY

## 2023-11-29 PROCEDURE — 2500000005 HC RX 250 GENERAL PHARMACY W/O HCPCS: Performed by: NEUROLOGICAL SURGERY

## 2023-11-29 PROCEDURE — 3600000004 HC OR TIME - INITIAL BASE CHARGE - PROCEDURE LEVEL FOUR: Performed by: NEUROLOGICAL SURGERY

## 2023-11-29 PROCEDURE — 86850 RBC ANTIBODY SCREEN: CPT | Performed by: ANESTHESIOLOGY

## 2023-11-29 RX ORDER — PROPOFOL 10 MG/ML
INJECTION, EMULSION INTRAVENOUS AS NEEDED
Status: DISCONTINUED | OUTPATIENT
Start: 2023-11-29 | End: 2023-11-29

## 2023-11-29 RX ORDER — CEFAZOLIN 1 G/1
INJECTION, POWDER, FOR SOLUTION INTRAVENOUS AS NEEDED
Status: DISCONTINUED | OUTPATIENT
Start: 2023-11-29 | End: 2023-11-29

## 2023-11-29 RX ORDER — OXYCODONE HYDROCHLORIDE 5 MG/1
10 TABLET ORAL EVERY 4 HOURS PRN
Status: DISCONTINUED | OUTPATIENT
Start: 2023-11-29 | End: 2023-11-30 | Stop reason: HOSPADM

## 2023-11-29 RX ORDER — HYDROMORPHONE HYDROCHLORIDE 1 MG/ML
1 INJECTION, SOLUTION INTRAMUSCULAR; INTRAVENOUS; SUBCUTANEOUS EVERY 5 MIN PRN
Status: DISCONTINUED | OUTPATIENT
Start: 2023-11-29 | End: 2023-11-29 | Stop reason: HOSPADM

## 2023-11-29 RX ORDER — CEFAZOLIN SODIUM 2 G/100ML
2 INJECTION, SOLUTION INTRAVENOUS EVERY 8 HOURS
Status: COMPLETED | OUTPATIENT
Start: 2023-11-29 | End: 2023-11-30

## 2023-11-29 RX ORDER — HYDROCHLOROTHIAZIDE 12.5 MG/1
12.5 TABLET ORAL DAILY
Status: DISCONTINUED | OUTPATIENT
Start: 2023-11-29 | End: 2023-11-30 | Stop reason: HOSPADM

## 2023-11-29 RX ORDER — MONTELUKAST SODIUM 10 MG/1
10 TABLET ORAL NIGHTLY
Status: DISCONTINUED | OUTPATIENT
Start: 2023-11-29 | End: 2023-11-30 | Stop reason: HOSPADM

## 2023-11-29 RX ORDER — CELECOXIB 100 MG/1
100 CAPSULE ORAL ONCE
Status: COMPLETED | OUTPATIENT
Start: 2023-11-29 | End: 2023-11-29

## 2023-11-29 RX ORDER — MORPHINE SULFATE 2 MG/ML
2 INJECTION, SOLUTION INTRAMUSCULAR; INTRAVENOUS EVERY 2 HOUR PRN
Status: DISCONTINUED | OUTPATIENT
Start: 2023-11-29 | End: 2023-11-30 | Stop reason: HOSPADM

## 2023-11-29 RX ORDER — CELECOXIB 100 MG/1
100 CAPSULE ORAL ONCE
Status: CANCELLED | OUTPATIENT
Start: 2023-11-29 | End: 2023-11-29

## 2023-11-29 RX ORDER — LIDOCAINE HYDROCHLORIDE 10 MG/ML
0.1 INJECTION INFILTRATION; PERINEURAL ONCE
Status: DISCONTINUED | OUTPATIENT
Start: 2023-11-29 | End: 2023-11-29 | Stop reason: HOSPADM

## 2023-11-29 RX ORDER — GABAPENTIN 300 MG/1
300 CAPSULE ORAL ONCE
Status: COMPLETED | OUTPATIENT
Start: 2023-11-29 | End: 2023-11-29

## 2023-11-29 RX ORDER — NALOXONE HYDROCHLORIDE 1 MG/ML
0.2 INJECTION INTRAMUSCULAR; INTRAVENOUS; SUBCUTANEOUS EVERY 5 MIN PRN
Status: DISCONTINUED | OUTPATIENT
Start: 2023-11-29 | End: 2023-11-30 | Stop reason: HOSPADM

## 2023-11-29 RX ORDER — DULOXETIN HYDROCHLORIDE 30 MG/1
60 CAPSULE, DELAYED RELEASE ORAL DAILY
Status: DISCONTINUED | OUTPATIENT
Start: 2023-11-29 | End: 2023-11-30 | Stop reason: HOSPADM

## 2023-11-29 RX ORDER — MEPERIDINE HYDROCHLORIDE 25 MG/ML
12.5 INJECTION INTRAMUSCULAR; INTRAVENOUS; SUBCUTANEOUS EVERY 10 MIN PRN
Status: DISCONTINUED | OUTPATIENT
Start: 2023-11-29 | End: 2023-11-29 | Stop reason: HOSPADM

## 2023-11-29 RX ORDER — GABAPENTIN 300 MG/1
300 CAPSULE ORAL 2 TIMES DAILY
Status: DISCONTINUED | OUTPATIENT
Start: 2023-11-29 | End: 2023-11-30 | Stop reason: HOSPADM

## 2023-11-29 RX ORDER — IPRATROPIUM BROMIDE 0.5 MG/2.5ML
2.5 SOLUTION RESPIRATORY (INHALATION) ONCE
Status: DISCONTINUED | OUTPATIENT
Start: 2023-11-29 | End: 2023-11-29 | Stop reason: HOSPADM

## 2023-11-29 RX ORDER — FENTANYL CITRATE 50 UG/ML
INJECTION, SOLUTION INTRAMUSCULAR; INTRAVENOUS AS NEEDED
Status: DISCONTINUED | OUTPATIENT
Start: 2023-11-29 | End: 2023-11-29

## 2023-11-29 RX ORDER — SODIUM CHLORIDE, SODIUM LACTATE, POTASSIUM CHLORIDE, CALCIUM CHLORIDE 600; 310; 30; 20 MG/100ML; MG/100ML; MG/100ML; MG/100ML
75 INJECTION, SOLUTION INTRAVENOUS CONTINUOUS
Status: DISCONTINUED | OUTPATIENT
Start: 2023-11-29 | End: 2023-11-29

## 2023-11-29 RX ORDER — METHYLPREDNISOLONE ACETATE 40 MG/ML
INJECTION, SUSPENSION INTRA-ARTICULAR; INTRALESIONAL; INTRAMUSCULAR; SOFT TISSUE AS NEEDED
Status: DISCONTINUED | OUTPATIENT
Start: 2023-11-29 | End: 2023-11-29 | Stop reason: HOSPADM

## 2023-11-29 RX ORDER — ONDANSETRON HYDROCHLORIDE 2 MG/ML
INJECTION, SOLUTION INTRAVENOUS AS NEEDED
Status: DISCONTINUED | OUTPATIENT
Start: 2023-11-29 | End: 2023-11-29

## 2023-11-29 RX ORDER — ROCURONIUM BROMIDE 10 MG/ML
INJECTION, SOLUTION INTRAVENOUS AS NEEDED
Status: DISCONTINUED | OUTPATIENT
Start: 2023-11-29 | End: 2023-11-29

## 2023-11-29 RX ORDER — GABAPENTIN 300 MG/1
300 CAPSULE ORAL ONCE
Status: CANCELLED | OUTPATIENT
Start: 2023-11-29 | End: 2023-11-29

## 2023-11-29 RX ORDER — VALSARTAN 80 MG/1
160 TABLET ORAL DAILY
Status: DISCONTINUED | OUTPATIENT
Start: 2023-11-29 | End: 2023-11-30 | Stop reason: HOSPADM

## 2023-11-29 RX ORDER — GLYCOPYRROLATE 0.2 MG/ML
INJECTION INTRAMUSCULAR; INTRAVENOUS AS NEEDED
Status: DISCONTINUED | OUTPATIENT
Start: 2023-11-29 | End: 2023-11-29

## 2023-11-29 RX ORDER — ACETAMINOPHEN 325 MG/1
650 TABLET ORAL ONCE
Status: COMPLETED | OUTPATIENT
Start: 2023-11-29 | End: 2023-11-29

## 2023-11-29 RX ORDER — OXYCODONE HYDROCHLORIDE 5 MG/1
5 TABLET ORAL EVERY 4 HOURS PRN
Status: DISCONTINUED | OUTPATIENT
Start: 2023-11-29 | End: 2023-11-30 | Stop reason: HOSPADM

## 2023-11-29 RX ORDER — MEPERIDINE HYDROCHLORIDE 25 MG/ML
INJECTION INTRAMUSCULAR; INTRAVENOUS; SUBCUTANEOUS AS NEEDED
Status: DISCONTINUED | OUTPATIENT
Start: 2023-11-29 | End: 2023-11-29

## 2023-11-29 RX ORDER — SODIUM CHLORIDE, SODIUM LACTATE, POTASSIUM CHLORIDE, CALCIUM CHLORIDE 600; 310; 30; 20 MG/100ML; MG/100ML; MG/100ML; MG/100ML
75 INJECTION, SOLUTION INTRAVENOUS CONTINUOUS
Status: CANCELLED | OUTPATIENT
Start: 2023-11-29

## 2023-11-29 RX ORDER — PHENYLEPHRINE 10 MG/250 ML(40 MCG/ML)IN 0.9 % SOD.CHLORIDE INTRAVENOUS
CONTINUOUS PRN
Status: DISCONTINUED | OUTPATIENT
Start: 2023-11-29 | End: 2023-11-29

## 2023-11-29 RX ORDER — DOCUSATE SODIUM 100 MG/1
100 CAPSULE, LIQUID FILLED ORAL 2 TIMES DAILY
Status: DISCONTINUED | OUTPATIENT
Start: 2023-11-29 | End: 2023-11-30 | Stop reason: HOSPADM

## 2023-11-29 RX ORDER — DEXTROSE, SODIUM CHLORIDE, SODIUM LACTATE, POTASSIUM CHLORIDE, AND CALCIUM CHLORIDE 5; .6; .31; .03; .02 G/100ML; G/100ML; G/100ML; G/100ML; G/100ML
100 INJECTION, SOLUTION INTRAVENOUS CONTINUOUS
Status: DISCONTINUED | OUTPATIENT
Start: 2023-11-29 | End: 2023-11-29

## 2023-11-29 RX ORDER — SODIUM CHLORIDE 0.9 G/100ML
IRRIGANT IRRIGATION AS NEEDED
Status: DISCONTINUED | OUTPATIENT
Start: 2023-11-29 | End: 2023-11-29 | Stop reason: HOSPADM

## 2023-11-29 RX ORDER — ALBUTEROL SULFATE 0.83 MG/ML
2.5 SOLUTION RESPIRATORY (INHALATION) AS NEEDED
Status: CANCELLED | OUTPATIENT
Start: 2023-11-29

## 2023-11-29 RX ORDER — MIDAZOLAM HYDROCHLORIDE 1 MG/ML
INJECTION, SOLUTION INTRAMUSCULAR; INTRAVENOUS AS NEEDED
Status: DISCONTINUED | OUTPATIENT
Start: 2023-11-29 | End: 2023-11-29

## 2023-11-29 RX ORDER — DEXAMETHASONE SODIUM PHOSPHATE 4 MG/ML
INJECTION, SOLUTION INTRA-ARTICULAR; INTRALESIONAL; INTRAMUSCULAR; INTRAVENOUS; SOFT TISSUE AS NEEDED
Status: DISCONTINUED | OUTPATIENT
Start: 2023-11-29 | End: 2023-11-29

## 2023-11-29 RX ORDER — ONDANSETRON 4 MG/1
4 TABLET, FILM COATED ORAL EVERY 8 HOURS PRN
Status: DISCONTINUED | OUTPATIENT
Start: 2023-11-29 | End: 2023-11-30 | Stop reason: HOSPADM

## 2023-11-29 RX ORDER — SODIUM CHLORIDE, SODIUM LACTATE, POTASSIUM CHLORIDE, CALCIUM CHLORIDE 600; 310; 30; 20 MG/100ML; MG/100ML; MG/100ML; MG/100ML
100 INJECTION, SOLUTION INTRAVENOUS CONTINUOUS
Status: DISCONTINUED | OUTPATIENT
Start: 2023-11-29 | End: 2023-11-30 | Stop reason: HOSPADM

## 2023-11-29 RX ORDER — LIDOCAINE HYDROCHLORIDE 20 MG/ML
INJECTION, SOLUTION INFILTRATION; PERINEURAL AS NEEDED
Status: DISCONTINUED | OUTPATIENT
Start: 2023-11-29 | End: 2023-11-29

## 2023-11-29 RX ORDER — CYCLOBENZAPRINE HCL 10 MG
10 TABLET ORAL 3 TIMES DAILY PRN
Status: DISCONTINUED | OUTPATIENT
Start: 2023-11-29 | End: 2023-11-30 | Stop reason: HOSPADM

## 2023-11-29 RX ORDER — ONDANSETRON HYDROCHLORIDE 2 MG/ML
4 INJECTION, SOLUTION INTRAVENOUS EVERY 8 HOURS PRN
Status: DISCONTINUED | OUTPATIENT
Start: 2023-11-29 | End: 2023-11-30 | Stop reason: HOSPADM

## 2023-11-29 RX ORDER — BUPIVACAINE HYDROCHLORIDE 5 MG/ML
INJECTION, SOLUTION PERINEURAL AS NEEDED
Status: DISCONTINUED | OUTPATIENT
Start: 2023-11-29 | End: 2023-11-29 | Stop reason: HOSPADM

## 2023-11-29 RX ORDER — ACETAMINOPHEN 325 MG/1
975 TABLET ORAL EVERY 8 HOURS
Status: DISCONTINUED | OUTPATIENT
Start: 2023-11-29 | End: 2023-11-30 | Stop reason: HOSPADM

## 2023-11-29 RX ORDER — ACETAMINOPHEN 325 MG/1
650 TABLET ORAL ONCE
Status: CANCELLED | OUTPATIENT
Start: 2023-11-29 | End: 2023-11-29

## 2023-11-29 RX ORDER — SODIUM CHLORIDE, SODIUM LACTATE, POTASSIUM CHLORIDE, CALCIUM CHLORIDE 600; 310; 30; 20 MG/100ML; MG/100ML; MG/100ML; MG/100ML
100 INJECTION, SOLUTION INTRAVENOUS CONTINUOUS
Status: DISCONTINUED | OUTPATIENT
Start: 2023-11-29 | End: 2023-11-29 | Stop reason: HOSPADM

## 2023-11-29 RX ORDER — AMLODIPINE BESYLATE 10 MG/1
10 TABLET ORAL DAILY
Status: DISCONTINUED | OUTPATIENT
Start: 2023-11-29 | End: 2023-11-30 | Stop reason: HOSPADM

## 2023-11-29 RX ORDER — PHENYLEPHRINE HCL IN 0.9% NACL 1 MG/10 ML
SYRINGE (ML) INTRAVENOUS AS NEEDED
Status: DISCONTINUED | OUTPATIENT
Start: 2023-11-29 | End: 2023-11-29

## 2023-11-29 RX ADMIN — ROCURONIUM BROMIDE 20 MG: 10 INJECTION, SOLUTION INTRAVENOUS at 12:26

## 2023-11-29 RX ADMIN — PHENYLEPHRINE 10 MG/250 ML(40 MCG/ML)IN 0.9 % SOD.CHLORIDE INTRAVENOUS 0.2 MCG/KG/MIN: at 12:44

## 2023-11-29 RX ADMIN — ROCURONIUM BROMIDE 20 MG: 10 INJECTION, SOLUTION INTRAVENOUS at 12:53

## 2023-11-29 RX ADMIN — ROCURONIUM BROMIDE 50 MG: 10 INJECTION, SOLUTION INTRAVENOUS at 11:29

## 2023-11-29 RX ADMIN — PROPOFOL 150 MG: 10 INJECTION, EMULSION INTRAVENOUS at 11:29

## 2023-11-29 RX ADMIN — LIDOCAINE HYDROCHLORIDE 100 MG: 20 INJECTION, SOLUTION INFILTRATION; PERINEURAL at 11:29

## 2023-11-29 RX ADMIN — CEFAZOLIN 2 G: 1 INJECTION, POWDER, FOR SOLUTION INTRAMUSCULAR; INTRAVENOUS at 12:00

## 2023-11-29 RX ADMIN — Medication 200 MCG: at 11:31

## 2023-11-29 RX ADMIN — CELECOXIB 100 MG: 100 CAPSULE ORAL at 11:14

## 2023-11-29 RX ADMIN — MONTELUKAST 10 MG: 10 TABLET, FILM COATED ORAL at 20:17

## 2023-11-29 RX ADMIN — GABAPENTIN 300 MG: 300 CAPSULE ORAL at 20:17

## 2023-11-29 RX ADMIN — FENTANYL CITRATE 50 MCG: 50 INJECTION, SOLUTION INTRAMUSCULAR; INTRAVENOUS at 14:03

## 2023-11-29 RX ADMIN — SODIUM CHLORIDE, POTASSIUM CHLORIDE, SODIUM LACTATE AND CALCIUM CHLORIDE: 600; 310; 30; 20 INJECTION, SOLUTION INTRAVENOUS at 11:12

## 2023-11-29 RX ADMIN — SODIUM CHLORIDE: 9 INJECTION, SOLUTION INTRAVENOUS at 12:19

## 2023-11-29 RX ADMIN — CEFAZOLIN SODIUM 2 G: 2 INJECTION, SOLUTION INTRAVENOUS at 20:18

## 2023-11-29 RX ADMIN — ROCURONIUM BROMIDE 10 MG: 10 INJECTION, SOLUTION INTRAVENOUS at 14:03

## 2023-11-29 RX ADMIN — Medication 100 MCG: at 12:23

## 2023-11-29 RX ADMIN — ONDANSETRON 4 MG: 2 INJECTION INTRAMUSCULAR; INTRAVENOUS at 14:45

## 2023-11-29 RX ADMIN — GLYCOPYRROLATE 0.1 MG: 0.2 INJECTION, SOLUTION INTRAMUSCULAR; INTRAVENOUS at 12:00

## 2023-11-29 RX ADMIN — DOCUSATE SODIUM 100 MG: 100 CAPSULE, LIQUID FILLED ORAL at 20:18

## 2023-11-29 RX ADMIN — MEPERIDINE HYDROCHLORIDE 25 MG: 25 INJECTION INTRAMUSCULAR; INTRAVENOUS; SUBCUTANEOUS at 15:25

## 2023-11-29 RX ADMIN — DEXAMETHASONE SODIUM PHOSPHATE 4 MG: 4 INJECTION, SOLUTION INTRAMUSCULAR; INTRAVENOUS at 12:02

## 2023-11-29 RX ADMIN — SUGAMMADEX 200 MG: 100 INJECTION, SOLUTION INTRAVENOUS at 15:04

## 2023-11-29 RX ADMIN — ACETAMINOPHEN 975 MG: 325 TABLET ORAL at 20:17

## 2023-11-29 RX ADMIN — ACETAMINOPHEN 650 MG: 325 TABLET ORAL at 11:14

## 2023-11-29 RX ADMIN — FENTANYL CITRATE 100 MCG: 50 INJECTION, SOLUTION INTRAMUSCULAR; INTRAVENOUS at 11:29

## 2023-11-29 RX ADMIN — Medication 100 MCG: at 12:29

## 2023-11-29 RX ADMIN — ROCURONIUM BROMIDE 10 MG: 10 INJECTION, SOLUTION INTRAVENOUS at 13:30

## 2023-11-29 RX ADMIN — SODIUM CHLORIDE, POTASSIUM CHLORIDE, SODIUM LACTATE AND CALCIUM CHLORIDE 100 ML/HR: 600; 310; 30; 20 INJECTION, SOLUTION INTRAVENOUS at 20:32

## 2023-11-29 RX ADMIN — MIDAZOLAM 2 MG: 1 INJECTION INTRAMUSCULAR; INTRAVENOUS at 11:29

## 2023-11-29 RX ADMIN — ROCURONIUM BROMIDE 10 MG: 10 INJECTION, SOLUTION INTRAVENOUS at 13:44

## 2023-11-29 RX ADMIN — HYDROMORPHONE HYDROCHLORIDE 0.5 MG: 1 INJECTION, SOLUTION INTRAMUSCULAR; INTRAVENOUS; SUBCUTANEOUS at 17:12

## 2023-11-29 RX ADMIN — GABAPENTIN 300 MG: 300 CAPSULE ORAL at 11:14

## 2023-11-29 RX ADMIN — SODIUM CHLORIDE, POTASSIUM CHLORIDE, SODIUM LACTATE AND CALCIUM CHLORIDE 75 ML/HR: 600; 310; 30; 20 INJECTION, SOLUTION INTRAVENOUS at 11:12

## 2023-11-29 RX ADMIN — Medication 200 MCG: at 12:13

## 2023-11-29 RX ADMIN — FENTANYL CITRATE 50 MCG: 50 INJECTION, SOLUTION INTRAMUSCULAR; INTRAVENOUS at 13:44

## 2023-11-29 RX ADMIN — DULOXETINE HYDROCHLORIDE 60 MG: 30 CAPSULE, DELAYED RELEASE ORAL at 20:31

## 2023-11-29 RX ADMIN — Medication 200 MCG: at 11:39

## 2023-11-29 SDOH — SOCIAL STABILITY: SOCIAL INSECURITY: DO YOU FEEL ANYONE HAS EXPLOITED OR TAKEN ADVANTAGE OF YOU FINANCIALLY OR OF YOUR PERSONAL PROPERTY?: NO

## 2023-11-29 SDOH — SOCIAL STABILITY: SOCIAL INSECURITY: WERE YOU ABLE TO COMPLETE ALL THE BEHAVIORAL HEALTH SCREENINGS?: YES

## 2023-11-29 SDOH — SOCIAL STABILITY: SOCIAL INSECURITY: DOES ANYONE TRY TO KEEP YOU FROM HAVING/CONTACTING OTHER FRIENDS OR DOING THINGS OUTSIDE YOUR HOME?: NO

## 2023-11-29 SDOH — SOCIAL STABILITY: SOCIAL INSECURITY: ARE YOU OR HAVE YOU BEEN THREATENED OR ABUSED PHYSICALLY, EMOTIONALLY, OR SEXUALLY BY ANYONE?: NO

## 2023-11-29 SDOH — SOCIAL STABILITY: SOCIAL INSECURITY: ARE THERE ANY APPARENT SIGNS OF INJURIES/BEHAVIORS THAT COULD BE RELATED TO ABUSE/NEGLECT?: NO

## 2023-11-29 SDOH — SOCIAL STABILITY: SOCIAL INSECURITY: HAVE YOU HAD THOUGHTS OF HARMING ANYONE ELSE?: NO

## 2023-11-29 SDOH — HEALTH STABILITY: MENTAL HEALTH: CURRENT SMOKER: 0

## 2023-11-29 SDOH — SOCIAL STABILITY: SOCIAL INSECURITY: ABUSE: ADULT

## 2023-11-29 SDOH — SOCIAL STABILITY: SOCIAL INSECURITY: DO YOU FEEL UNSAFE GOING BACK TO THE PLACE WHERE YOU ARE LIVING?: NO

## 2023-11-29 SDOH — SOCIAL STABILITY: SOCIAL INSECURITY: HAS ANYONE EVER THREATENED TO HURT YOUR FAMILY OR YOUR PETS?: NO

## 2023-11-29 ASSESSMENT — ACTIVITIES OF DAILY LIVING (ADL)
BATHING: INDEPENDENT
LACK_OF_TRANSPORTATION: NO
PATIENT'S MEMORY ADEQUATE TO SAFELY COMPLETE DAILY ACTIVITIES?: YES
FEEDING YOURSELF: INDEPENDENT
TOILETING: INDEPENDENT
GROOMING: INDEPENDENT
HEARING - RIGHT EAR: FUNCTIONAL
DRESSING YOURSELF: INDEPENDENT
WALKS IN HOME: INDEPENDENT
HEARING - LEFT EAR: FUNCTIONAL
ADEQUATE_TO_COMPLETE_ADL: YES
JUDGMENT_ADEQUATE_SAFELY_COMPLETE_DAILY_ACTIVITIES: YES

## 2023-11-29 ASSESSMENT — COGNITIVE AND FUNCTIONAL STATUS - GENERAL
MOVING TO AND FROM BED TO CHAIR: A LITTLE
MOBILITY SCORE: 20
MOBILITY SCORE: 23
DAILY ACTIVITIY SCORE: 24
PATIENT BASELINE BEDBOUND: NO
CLIMB 3 TO 5 STEPS WITH RAILING: A LITTLE
STANDING UP FROM CHAIR USING ARMS: A LITTLE
WALKING IN HOSPITAL ROOM: A LITTLE
WALKING IN HOSPITAL ROOM: A LITTLE

## 2023-11-29 ASSESSMENT — PAIN SCALES - GENERAL
PAINLEVEL_OUTOF10: 0 - NO PAIN
PAINLEVEL_OUTOF10: 2
PAINLEVEL_OUTOF10: 6
PAINLEVEL_OUTOF10: 3
PAINLEVEL_OUTOF10: 0 - NO PAIN
PAINLEVEL_OUTOF10: 0 - NO PAIN
PAIN_LEVEL: 0
PAINLEVEL_OUTOF10: 6
PAINLEVEL_OUTOF10: 3
PAINLEVEL_OUTOF10: 0 - NO PAIN
PAINLEVEL_OUTOF10: 4
PAINLEVEL_OUTOF10: 0 - NO PAIN
PAINLEVEL_OUTOF10: 4
PAINLEVEL_OUTOF10: 0 - NO PAIN
PAINLEVEL_OUTOF10: 3

## 2023-11-29 ASSESSMENT — COLUMBIA-SUICIDE SEVERITY RATING SCALE - C-SSRS
1. IN THE PAST MONTH, HAVE YOU WISHED YOU WERE DEAD OR WISHED YOU COULD GO TO SLEEP AND NOT WAKE UP?: NO
2. HAVE YOU ACTUALLY HAD ANY THOUGHTS OF KILLING YOURSELF?: NO
6. HAVE YOU EVER DONE ANYTHING, STARTED TO DO ANYTHING, OR PREPARED TO DO ANYTHING TO END YOUR LIFE?: NO

## 2023-11-29 ASSESSMENT — LIFESTYLE VARIABLES
AUDIT-C TOTAL SCORE: 0
SKIP TO QUESTIONS 9-10: 1
SUBSTANCE_ABUSE_PAST_12_MONTHS: NO
AUDIT-C TOTAL SCORE: 0
HOW OFTEN DO YOU HAVE A DRINK CONTAINING ALCOHOL: NEVER
HOW MANY STANDARD DRINKS CONTAINING ALCOHOL DO YOU HAVE ON A TYPICAL DAY: PATIENT DOES NOT DRINK
PRESCIPTION_ABUSE_PAST_12_MONTHS: NO
HOW OFTEN DO YOU HAVE 6 OR MORE DRINKS ON ONE OCCASION: NEVER

## 2023-11-29 ASSESSMENT — PAIN DESCRIPTION - DESCRIPTORS
DESCRIPTORS: SORE
DESCRIPTORS: SORE

## 2023-11-29 ASSESSMENT — PAIN - FUNCTIONAL ASSESSMENT
PAIN_FUNCTIONAL_ASSESSMENT: 0-10

## 2023-11-29 ASSESSMENT — PATIENT HEALTH QUESTIONNAIRE - PHQ9
1. LITTLE INTEREST OR PLEASURE IN DOING THINGS: NOT AT ALL
SUM OF ALL RESPONSES TO PHQ9 QUESTIONS 1 & 2: 0
2. FEELING DOWN, DEPRESSED OR HOPELESS: NOT AT ALL

## 2023-11-29 NOTE — ANESTHESIA PREPROCEDURE EVALUATION
Patient: Katlyn Jeff    Procedure Information       Date/Time: 11/29/23 1130    Procedure: L4-5, L-5, S-1 DECOMPRESSIVE LUMBAR LAMINECTOMY WITH FLAT PLATE X-RAY - CPT codes for this case  77253, 51006    Location: [facility] / [facility]    Surgeons: Saji Echavarria MD            Relevant Problems   Anesthesia   (+) History of general anesthesia      Endocrine   (+) Diabetes mellitus type 2 without retinopathy (CMS/HCC)      Musculoskeletal   (+) Idiopathic scoliosis and kyphoscoliosis   (+) Scoliosis, congenital       Clinical information reviewed:                   NPO Detail:  No data recorded     Physical Exam    Airway  Mallampati: I  TM distance: >3 FB  Neck ROM: full     Cardiovascular - normal exam     Dental - normal exam     Pulmonary - normal exam     Abdominal - normal exam             Anesthesia Plan    ASA 2     general     The patient is not a current smoker.  Patient was previously instructed to abstain from smoking on day of procedure.  Patient did not smoke on day of procedure.    intravenous induction   Anesthetic plan and risks discussed with patient.  Use of blood products discussed with patient who consented to blood products.    Plan discussed with CRNA.

## 2023-11-29 NOTE — CONSULTS
"Hospital Medicine Initial Consult Note    Patient Name: Katlyn Jeff   YOB: 1965    Subjective:  Katlyn Jeff is a 58 y.o. female, with GERD, HTN, depression, T2DM, hx hep C, chronic back pain, spinal stenosis, who is admitted after undergoing L4-S1 decompressive laminectomy with Dr. Echavarria earlier today. Patient feels well post-op and states her pain is currently well controlled. She denies any new paresthesias, unilateral weakness, nausea, vomiting, abd pain, CP, sob, cough or other acute concerns. Tolerating sips of clears without issue.     A 10 point ROS was completed and is negative expect as stated in HPI.     Past Medical History:  As above    Past Surgical History:  Cholecystectomy, ankle surgery, C-spine fusion, tonsillectomy    Social History: Smoking - never smoker, Alcohol - rare, Recreational Drugs - denies    Family History: DM, gastric cancer, HTN    Objective:    /72 (BP Location: Right arm, Patient Position: Lying)   Pulse 74   Temp 36.4 °C (97.5 °F) (Temporal)   Resp 16   Ht 1.6 m (5' 3\")   Wt 83 kg (182 lb 15.7 oz)   SpO2 100%   BMI 32.41 kg/m²     Physical Exam:  GENERAL: Well developed, A&Ox3, no distress, alert and cooperative  HEENT: Normocephalic, atraumatic, EOMI, clear sclera, MMM  CARDIOVASCULAR: Regular rate and rhythm, no murmurs, rubs, or gallops. S1/S2  RESPIRATORY: CTAB, no wheezes, rales or rhonchi. No distress  ABDOMEN: Soft, ND, non-tender. No rebound or guarding. BS+  EXTREMITIES: No peripheral edema  NEUROLOGICAL: A&O x3. CN II-XII grossly intact. No focal deficits. Equal strength & sensation b/l, no slurred speech or facial droop  SKIN: Warm and dry, no rashes  PSYCH: appropriate mood and affect      Assessment/Plan:    Neurogenic claudication due to lumbar spinal stenosis s/p L4-S1 decompressive laminectomy 11/29/23    GERD  HTN  Depression  T2DM  Hx hep C    Pain control & post-op care per neurosurgery  Started on CLD tonight, IVFs  PT/OT, " IS  Will obtain labs  Soft BP post-op, will hold home antihypertensives for now & resume as able  Continue cymbalta, gabapentin, Singulair. On Ozempic at home, will monitor BG & start ISS if needed    DVT Prophylaxis: SCDs per neurosurgery  Code Status: Full Code   Disposition: med/ surg      Colleen Tipton, [facility]

## 2023-11-29 NOTE — ANESTHESIA PROCEDURE NOTES
Airway  Date/Time: 11/29/2023 11:29 AM  Urgency: elective    Airway not difficult    Staffing  Performed: CRNA   Authorized by: Feliciano Corado MD    Performed by: NENA Patton  Patient location during procedure: OR    Indications and Patient Condition  Indications for airway management: anesthesia  Spontaneous Ventilation: absent  Sedation level: deep  Preoxygenated: yes  Patient position: sniffing  MILS maintained throughout  Mask difficulty assessment: 1 - vent by mask  Planned trial extubation    Final Airway Details  Final airway type: endotracheal airway      Successful airway: ETT  Cuffed: yes   Successful intubation technique: direct laryngoscopy  Facilitating devices/methods: intubating stylet  Blade: Cornelius  Blade size: #4  ETT size (mm): 7.5  Cormack-Lehane Classification: grade I - full view of glottis  Placement verified by: chest auscultation, capnometry and palpation of cuff   Inital cuff pressure (cm H2O): 20  Cuff volume (mL): 8  Measured from: teeth  ETT to teeth (cm): 21  Number of attempts at approach: 1  Ventilation between attempts: none  Number of other approaches attempted: 0

## 2023-11-29 NOTE — ANESTHESIA POSTPROCEDURE EVALUATION
Patient: aKtlyn Jeff    Procedure Summary       Date: 11/29/23 Room / Location: [facility] / [facility]    Anesthesia Start: 1124 Anesthesia Stop:     Procedure: L4-5, L-5, S-1 DECOMPRESSIVE LUMBAR LAMINECTOMY WITH FLAT PLATE X-RAY (Back) Diagnosis:       Neurogenic claudication due to lumbar spinal stenosis      (Neurogenic claudication due to lumbar spinal stenosis [M48.062])    Surgeons: Saji Echavarria MD Responsible Provider: Feliciano Corado MD    Anesthesia Type: general ASA Status: 2            Anesthesia Type: general    Vitals Value Taken Time   /68 11/29/23 1524   Temp 36 11/29/23 1524   Pulse 91 11/29/23 1524   Resp 14 11/29/23 1524   SpO2 100 11/29/23 1524       Anesthesia Post Evaluation    Patient location during evaluation: PACU  Patient participation: complete - patient participated  Level of consciousness: awake and alert  Pain score: 0  Pain management: adequate  Multimodal analgesia pain management approach  Airway patency: patent  Cardiovascular status: acceptable  Respiratory status: acceptable  Hydration status: acceptable  Postoperative Nausea and Vomiting: none        No notable events documented.

## 2023-11-29 NOTE — ANESTHESIA PROCEDURE NOTES
Arterial Line:    Date/Time: 11/29/2023 11:43 AM    Staffing  Performed: CRNA   Authorized by: Feliciano Corado MD    Performed by: NENA Patton    An arterial line was placed. Procedure performed using surface landmarks.in the OR for the following indication(s): continuous blood pressure monitoring.    A 20 gauge (size), 5 cm (length), Arrow (type) catheter was placed into the Right radial artery, secured by Tegaderm,   Seldinger technique used.  Events:  patient tolerated procedure well with no complications.

## 2023-11-29 NOTE — OP NOTE
L4-5, L-5, S-1 DECOMPRESSIVE LUMBAR LAMINECTOMY WITH FLAT PLATE X-RAY Procedure Note      Pre-operative Diagnosis:Pre-Op Diagnosis Codes:     * Neurogenic claudication due to lumbar spinal stenosis [M48.062]     Post-operative Diagnosis: Post-op Diagnosis     * Neurogenic claudication due to lumbar spinal stenosis [M48.062]     Surgeon:    * Saji Echavarria - Primary     Anesthesia staff:Anesthesiologist: Feliciano Corado MD  CRNA: Vonda Garcias, TYLER-CRNA; TYLER Red-CRNA    Procedure: Procedure(s):  L4-5, L-5, S-1 DECOMPRESSIVE LUMBAR LAMINECTOMY WITH FLAT PLATE X-RAY    Findings: The decompressive laminectomy at L4-5 and L5-S1 went without incident.  The calcified disc at L4-5 was made smaller but not completely removed.  The severe foraminal stenosis at L5 on the right was opened and the nerve root was free as it egressed.  Both S1 nerve roots were also mobilized.  There were no durotomies and blood loss was minimal.    Estimated Blood Loss: 50 cc    Drains: Epidural and Castelan catheter    Specimen:No specimens collected     Implants: Nothing was implanted during the procedure     Procedure Detail:  The patient was brought into the operating room and a timeout huddle was performed.  General anesthesia was administered.  We position the patient prone on the Von frame.  The back was clipped and draped in usual sterile fashion.  An x-ray was taken to verify proper location for the skin incision.  Midline incision was drawn out and then it was infiltrated with local anesthetic.  The skin was incised with a #10 blade.  Hemostasis was obtained with the bipolar electrocautery.  And the Bovie was used to incise down through the subcutaneous fat down to the fascia.  The fascia was incised along the spinous processes of L4 and L5 and a small portion of the inferior portion of L3 and the top of S1.  Once we had a confirmatory x-ray that we are on the spinous processes of L4 and L5 I then removed those  spinous processes.  I then thinned the lamina with a Leksell rondure.  Was able to get a curette underneath the lamina leaving the ligamentum flavum behind.  I then used different sized Kerrisons to complete her decompression.  I did use the chisel to take off a portion of the inferior articulating process of L5 and L4 on the right side.  Then I took off a small portion of S1 and carried the decompression out to the pedicles of S1 and followed the superior articulating process to the top of the insertion of the ligamentum flavum.  This was accomplished on both sides at S1.  I then focused on the L4 and L5 nerve roots on the right side.  I opened up the foramen at L5 on the right by she is laying out of portion of the calcified disc and using [place] curved foraminotomy opening Kerrisons to remove any other soft tissue.  Then I went back up to the L4-5 disc and freed up the shoulder of the L5 nerve root and then I mobilized the thecal sac so that I could take out a portion of the calcified disc herniation.  This was scarred to the undersurface of the thecal sac.  Was very meticulous in my dissection and did not get a durotomy I felt the undersurface of the thecal sac was much freer at the end of this maneuver.  Once I was satisfied that the thecal sac and nerve roots were free and irrigated with copious amounts of antibiotic solution.   Then I took a drain and exited through separate stab site.  The muscle was allowed to fall back into its normal position after excellent hemostasis was obtained.  We irrigated with copious amounts of antibiotic irrigation before starting her closure.  And the fascia was closed in a watertight manner with interrupted 0 Vicryl sutures.  Then I used a strata fix 0 PDS suture to reinforce the fascial layer.  Putting 10 cc of half percent Marcaine into the paraspinal muscles.  I then used inverted interrupted 2-0 Vicryl in the subcutaneous tissue.  And then closed the skin with a  subcuticular running stitch of 4-0 Vicryl and reinforced with glue.  The patient was stable and tolerated the procedure well.      Condition: stable     Complication: None       Saji Echavarria MD, FAANS, FACS  Board Certified Neurosurgeon  [facility]   of Neurological Surgery  [employer]  Office: [phone]  Fax: [phone]

## 2023-11-29 NOTE — CARE PLAN
The patient's goals for the shift include pain control    The clinical goals for the shift include pain control      Problem: Pain - Adult  Goal: Verbalizes/displays adequate comfort level or baseline comfort level  Outcome: Progressing     Problem: Safety - Adult  Goal: Free from fall injury  Outcome: Progressing     Problem: Discharge Planning  Goal: Discharge to home or other facility with appropriate resources  Outcome: Progressing     Problem: Chronic Conditions and Co-morbidities  Goal: Patient's chronic conditions and co-morbidity symptoms are monitored and maintained or improved  Outcome: Progressing     Problem: Diabetes  Goal: Achieve decreasing blood glucose levels by end of shift  Outcome: Progressing  Goal: Increase stability of blood glucose readings by end of shift  Outcome: Progressing  Goal: Decrease in ketones present in urine by end of shift  Outcome: Progressing  Goal: Maintain electrolyte levels within acceptable range throughout shift  Outcome: Progressing  Goal: Maintain glucose levels >70mg/dl to <250mg/dl throughout shift  Outcome: Progressing  Goal: No changes in neurological exam by end of shift  Outcome: Progressing  Goal: Learn about and adhere to nutrition recommendations by end of shift  Outcome: Progressing  Goal: Vital signs within normal range for age by end of shift  Outcome: Progressing  Goal: Increase self care and/or family involovement by end of shift  Outcome: Progressing  Goal: Receive DSME education by end of shift  Outcome: Progressing     Problem: Pain  Goal: Takes deep breaths with improved pain control throughout the shift  Outcome: Progressing  Goal: Turns in bed with improved pain control throughout the shift  Outcome: Progressing  Goal: Walks with improved pain control throughout the shift  Outcome: Progressing  Goal: Performs ADL's with improved pain control throughout shift  Outcome: Progressing  Goal: Participates in PT with improved pain control throughout the  shift  Outcome: Progressing  Goal: Free from opioid side effects throughout the shift  Outcome: Progressing  Goal: Free from acute confusion related to pain meds throughout the shift  Outcome: Progressing

## 2023-11-30 VITALS
WEIGHT: 182.98 LBS | HEART RATE: 78 BPM | HEIGHT: 63 IN | TEMPERATURE: 97.7 F | BODY MASS INDEX: 32.42 KG/M2 | RESPIRATION RATE: 16 BRPM | SYSTOLIC BLOOD PRESSURE: 117 MMHG | DIASTOLIC BLOOD PRESSURE: 71 MMHG | OXYGEN SATURATION: 100 %

## 2023-11-30 LAB
ANION GAP SERPL CALC-SCNC: 11 MMOL/L (ref 10–20)
BUN SERPL-MCNC: 13 MG/DL (ref 6–23)
CALCIUM SERPL-MCNC: 9.2 MG/DL (ref 8.6–10.3)
CHLORIDE SERPL-SCNC: 102 MMOL/L (ref 98–107)
CO2 SERPL-SCNC: 28 MMOL/L (ref 21–32)
CREAT SERPL-MCNC: 0.76 MG/DL (ref 0.5–1.05)
ERYTHROCYTE [DISTWIDTH] IN BLOOD BY AUTOMATED COUNT: 12.3 % (ref 11.5–14.5)
GFR SERPL CREATININE-BSD FRML MDRD: 90 ML/MIN/1.73M*2
GLUCOSE BLD MANUAL STRIP-MCNC: 130 MG/DL (ref 74–99)
GLUCOSE SERPL-MCNC: 126 MG/DL (ref 74–99)
HCT VFR BLD AUTO: 37.5 % (ref 36–46)
HGB BLD-MCNC: 12.1 G/DL (ref 12–16)
MCH RBC QN AUTO: 31 PG (ref 26–34)
MCHC RBC AUTO-ENTMCNC: 32.3 G/DL (ref 32–36)
MCV RBC AUTO: 96 FL (ref 80–100)
NRBC BLD-RTO: 0 /100 WBCS (ref 0–0)
PLATELET # BLD AUTO: 348 X10*3/UL (ref 150–450)
POTASSIUM SERPL-SCNC: 4.3 MMOL/L (ref 3.5–5.3)
RBC # BLD AUTO: 3.9 X10*6/UL (ref 4–5.2)
SODIUM SERPL-SCNC: 137 MMOL/L (ref 136–145)
WBC # BLD AUTO: 11.6 X10*3/UL (ref 4.4–11.3)

## 2023-11-30 PROCEDURE — 80048 BASIC METABOLIC PNL TOTAL CA: CPT | Performed by: NEUROLOGICAL SURGERY

## 2023-11-30 PROCEDURE — 7100000011 HC EXTENDED STAY RECOVERY HOURLY - NURSING UNIT

## 2023-11-30 PROCEDURE — 36415 COLL VENOUS BLD VENIPUNCTURE: CPT | Performed by: NEUROLOGICAL SURGERY

## 2023-11-30 PROCEDURE — 82947 ASSAY GLUCOSE BLOOD QUANT: CPT

## 2023-11-30 PROCEDURE — 2500000004 HC RX 250 GENERAL PHARMACY W/ HCPCS (ALT 636 FOR OP/ED): Performed by: NEUROLOGICAL SURGERY

## 2023-11-30 PROCEDURE — 97161 PT EVAL LOW COMPLEX 20 MIN: CPT | Mod: GP

## 2023-11-30 PROCEDURE — 99238 HOSP IP/OBS DSCHRG MGMT 30/<: CPT | Performed by: NURSE PRACTITIONER

## 2023-11-30 PROCEDURE — 2500000001 HC RX 250 WO HCPCS SELF ADMINISTERED DRUGS (ALT 637 FOR MEDICARE OP): Performed by: NEUROLOGICAL SURGERY

## 2023-11-30 PROCEDURE — 97165 OT EVAL LOW COMPLEX 30 MIN: CPT | Mod: GO

## 2023-11-30 PROCEDURE — 85027 COMPLETE CBC AUTOMATED: CPT | Performed by: NEUROLOGICAL SURGERY

## 2023-11-30 RX ORDER — OXYCODONE HYDROCHLORIDE 5 MG/1
5 TABLET ORAL EVERY 6 HOURS PRN
Qty: 28 TABLET | Refills: 0 | Status: SHIPPED | OUTPATIENT
Start: 2023-11-30 | End: 2023-12-06 | Stop reason: SDUPTHER

## 2023-11-30 RX ORDER — IBUPROFEN 800 MG/1
800 TABLET ORAL EVERY 8 HOURS PRN
Start: 2023-12-04

## 2023-11-30 RX ORDER — CYCLOBENZAPRINE HCL 10 MG
10 TABLET ORAL 3 TIMES DAILY PRN
Qty: 30 TABLET | Refills: 1 | Status: SHIPPED | OUTPATIENT
Start: 2023-11-30 | End: 2023-12-08 | Stop reason: SDUPTHER

## 2023-11-30 RX ORDER — DOCUSATE SODIUM 100 MG/1
100 CAPSULE, LIQUID FILLED ORAL 2 TIMES DAILY PRN
Start: 2023-11-30 | End: 2024-03-26 | Stop reason: WASHOUT

## 2023-11-30 RX ORDER — ACETAMINOPHEN 325 MG/1
975 TABLET ORAL EVERY 8 HOURS PRN
Start: 2023-11-30 | End: 2024-03-26 | Stop reason: WASHOUT

## 2023-11-30 RX ORDER — MONTELUKAST SODIUM 10 MG/1
10 TABLET ORAL NIGHTLY
Start: 2023-11-30

## 2023-11-30 RX ADMIN — OXYCODONE HYDROCHLORIDE 5 MG: 5 TABLET ORAL at 00:26

## 2023-11-30 RX ADMIN — DOCUSATE SODIUM 100 MG: 100 CAPSULE, LIQUID FILLED ORAL at 07:52

## 2023-11-30 RX ADMIN — CEFAZOLIN SODIUM 2 G: 2 INJECTION, SOLUTION INTRAVENOUS at 03:59

## 2023-11-30 RX ADMIN — DULOXETINE HYDROCHLORIDE 60 MG: 30 CAPSULE, DELAYED RELEASE ORAL at 07:51

## 2023-11-30 RX ADMIN — OXYCODONE HYDROCHLORIDE 5 MG: 5 TABLET ORAL at 06:58

## 2023-11-30 RX ADMIN — ACETAMINOPHEN 975 MG: 325 TABLET ORAL at 03:59

## 2023-11-30 RX ADMIN — GABAPENTIN 300 MG: 300 CAPSULE ORAL at 07:52

## 2023-11-30 ASSESSMENT — COGNITIVE AND FUNCTIONAL STATUS - GENERAL
CLIMB 3 TO 5 STEPS WITH RAILING: A LITTLE
MOBILITY SCORE: 23
HELP NEEDED FOR BATHING: A LITTLE
DRESSING REGULAR LOWER BODY CLOTHING: A LITTLE
DAILY ACTIVITIY SCORE: 22

## 2023-11-30 ASSESSMENT — PAIN SCALES - GENERAL
PAINLEVEL_OUTOF10: 4
PAINLEVEL_OUTOF10: 2
PAINLEVEL_OUTOF10: 5 - MODERATE PAIN
PAINLEVEL_OUTOF10: 4
PAINLEVEL_OUTOF10: 1

## 2023-11-30 ASSESSMENT — PAIN - FUNCTIONAL ASSESSMENT
PAIN_FUNCTIONAL_ASSESSMENT: 0-10
PAIN_FUNCTIONAL_ASSESSMENT: 0-10

## 2023-11-30 NOTE — POST-PROCEDURE NOTE
"Sitting up in bedside chair.   Reports ambulating in halls with staff last evening and did well  Still with some RLE symptoms, but overall improved  Surgical Drain output 70 + 50 mL  Drain Removed without incident  Lumbar incision is well approximated, clean / dry / intact  Voiding QS  Tolerating oral intake well    /63   Pulse 82   Temp 36.3 °C (97.3 °F)   Resp 18   Ht 1.6 m (5' 3\")   Wt 83 kg (182 lb 15.7 oz)   SpO2 99%   BMI 32.41 kg/m²     Scheduled medications  acetaminophen, 975 mg, oral, q8h  [Held by provider] amLODIPine, 10 mg, oral, Daily  docusate sodium, 100 mg, oral, BID  DULoxetine, 60 mg, oral, Daily  gabapentin, 300 mg, oral, BID  [Held by provider] hydroCHLOROthiazide, 12.5 mg, oral, Daily  montelukast, 10 mg, oral, Nightly  [Held by provider] valsartan, 160 mg, oral, Daily    Continuous medications  lactated Ringer's, 100 mL/hr, Last Rate: 100 mL/hr (11/29/23 2032)    PRN medications  PRN medications: cyclobenzaprine, morphine, naloxone, ondansetron **OR** ondansetron, oxyCODONE, oxyCODONE    POD#1 L4 - S1 decompressive laminectomy by Dr. Saji Echavarria:  Progressing well  - Discharge home today after PT / OT eval  - LSO when out of bed  - Follow up as previously scheduled  Maryellen Fitch, APRN-CNP    "

## 2023-11-30 NOTE — CARE PLAN
Problem: Pain - Adult  Goal: Verbalizes/displays adequate comfort level or baseline comfort level  11/30/2023 1130 by Belen Longoria RN  Outcome: Met  11/30/2023 0846 by Belen Longoria RN  Outcome: Progressing     Problem: Discharge Planning  Goal: Discharge to home or other facility with appropriate resources  11/30/2023 1130 by Belen Longoria RN  Outcome: Met  11/30/2023 0846 by Belen Longoria RN  Outcome: Progressing     Problem: Chronic Conditions and Co-morbidities  Goal: Patient's chronic conditions and co-morbidity symptoms are monitored and maintained or improved  11/30/2023 1130 by Belen Longoria RN  Outcome: Met  11/30/2023 0846 by Belen Longoria RN  Outcome: Progressing     Problem: Diabetes  Goal: Achieve decreasing blood glucose levels by end of shift  11/30/2023 1130 by Belen Longoria RN  Outcome: Met  11/30/2023 0846 by Belen Longoria RN  Outcome: Progressing  Goal: Increase stability of blood glucose readings by end of shift  11/30/2023 1130 by Belen Longoria RN  Outcome: Met  11/30/2023 0846 by Belen Longoria RN  Outcome: Progressing  Goal: Decrease in ketones present in urine by end of shift  11/30/2023 1130 by Belen Longoria RN  Outcome: Met  11/30/2023 0846 by Belen Longoria RN  Outcome: Progressing  Goal: Maintain electrolyte levels within acceptable range throughout shift  11/30/2023 1130 by Belen Longoria RN  Outcome: Met  11/30/2023 0846 by Belen Longoria RN  Outcome: Progressing  Goal: Maintain glucose levels >70mg/dl to <250mg/dl throughout shift  11/30/2023 1130 by Belen Longoria RN  Outcome: Met  11/30/2023 0846 by Belen Longoria RN  Outcome: Progressing  Goal: No changes in neurological exam by end of shift  11/30/2023 1130 by Belen Longoria RN  Outcome: Met  11/30/2023 0846 by Belen Longoria RN  Outcome: Progressing  Goal: Learn about and adhere to nutrition recommendations by end of shift  11/30/2023 1130 by Belen Longoria RN  Outcome: Met  11/30/2023 0846 by Belen Longoria  RN  Outcome: Progressing  Goal: Vital signs within normal range for age by end of shift  11/30/2023 1130 by Belen Longoria RN  Outcome: Met  11/30/2023 0846 by Belen Longoria RN  Outcome: Progressing  Goal: Increase self care and/or family involovement by end of shift  11/30/2023 1130 by Belen Longoria RN  Outcome: Met  11/30/2023 0846 by Belen Longoria RN  Outcome: Progressing  Goal: Receive DSME education by end of shift  11/30/2023 1130 by Belen Longoria RN  Outcome: Met  11/30/2023 0846 by Belen Longoria RN  Outcome: Progressing     Problem: Pain  Goal: Takes deep breaths with improved pain control throughout the shift  11/30/2023 1130 by Belen Longoria RN  Outcome: Met  11/30/2023 0846 by Belen Longoria RN  Outcome: Progressing  Goal: Turns in bed with improved pain control throughout the shift  11/30/2023 1130 by Belen Longoria RN  Outcome: Met  11/30/2023 0846 by Belen Longoria RN  Outcome: Progressing  Goal: Walks with improved pain control throughout the shift  11/30/2023 1130 by Belen Longoria RN  Outcome: Met  11/30/2023 0846 by Belen Longoria RN  Outcome: Progressing  Goal: Performs ADL's with improved pain control throughout shift  11/30/2023 1130 by Belen Longoria RN  Outcome: Met  11/30/2023 0846 by Belen Longoria RN  Outcome: Progressing  Goal: Participates in PT with improved pain control throughout the shift  11/30/2023 1130 by Belen Longoria RN  Outcome: Met  11/30/2023 0846 by Belen Longoria RN  Outcome: Progressing  Goal: Free from opioid side effects throughout the shift  11/30/2023 1130 by Belen Longoria RN  Outcome: Met  11/30/2023 0846 by Belen Longoria RN  Outcome: Progressing  Goal: Free from acute confusion related to pain meds throughout the shift  11/30/2023 1130 by Belen Longoria RN  Outcome: Met  11/30/2023 0846 by Belen Longoria RN  Outcome: Progressing

## 2023-11-30 NOTE — PROGRESS NOTES
Occupational Therapy    Evaluation    Patient Name: Katlyn Jeff  MRN: [id number]  Today's Date: 11/30/2023  Time Calculation  Start Time: 0912  Stop Time: 0943  Time Calculation (min): 31 min        Assessment:  Prognosis: Excellent  Barriers to Discharge: None  End of Session Communication: Bedside nurse  End of Session Patient Position:  up in chair, call light in reach.  OT Assessment Results:  Patient demonstrates good understanding of all presented info.     Plan:  No Skilled OT: No acute OT goals identified  OT Discharge Recommendations: No OT needed after discharge  OT - OK to Discharge: Yes       Subjective   Current Problem:  1. Pre-op testing  Staphylococcus aureus/MRSA colonization, Culture      2. Neurogenic claudication due to lumbar spinal stenosis  Coagulation Screen    Coagulation Screen    CBC    CBC    Basic Metabolic Panel    Basic Metabolic Panel    Initiate extended recovery    Pulse oximetry, spot    Initiate extended recovery    Pulse oximetry, spot    ibuprofen 800 mg tablet    DISCONTINUED: dextrose 5 % and lactated Ringer's infusion      3. History of excision of lamina of lumbar vertebra for decompression of spinal cord  acetaminophen (Tylenol) 325 mg tablet    docusate sodium (Colace) 100 mg capsule    oxyCODONE (Roxicodone) 5 mg immediate release tablet    cyclobenzaprine (Flexeril) 10 mg tablet      4. History of general anesthesia  montelukast (Singulair) 10 mg tablet      5. Acute post-operative pain  acetaminophen (Tylenol) 325 mg tablet    oxyCODONE (Roxicodone) 5 mg immediate release tablet    cyclobenzaprine (Flexeril) 10 mg tablet        General:  General  Reason for Referral: OT eval & treat  Referred By: Saji Echavarria  Past Medical History Relevant to Rehab: 11/29/23: L4-5, L5-S1 decomression lumbar laminectomy  Prior to Session Communication: Bedside nurse  Patient Position Received: Up in chair, Alarm off, not on at start of session    Precautions:  Precautions Comment:  back/spinal precautions, back brace     Pain:  Pain Assessment  Pain Assessment: 0-10  Pain Score: 1  Pain Type: Surgical pain  Pain Location: Back    Objective   Cognition: WFL         Home Living:  Type of Home: Apartment  Lives With: Alone (Friends in area supportive)  Home Layout: One level  Home Access: Elevator  Bathroom Shower/Tub: Tub/shower unit  Bathroom Toilet: Standard  Bathroom Equipment:  (plastic shower seat)    Prior Function:  Level of [place]: Independent with ADLs and functional transfers, Independent with homemaking with ambulation       ADL:  Grooming Assistance: Independent  UE Dressing Assistance: Independent  LE Dressing Assistance: Stand by assist/supervision; Educated re: back/spinal precautions; able to cross BLE to don socks/shoes without difficulty.  Toileting Assistance with Device: Supervision    Activity Tolerance:  Endurance: Endurance does not limit participation in activity    Bed Mobility/Transfers: Bed Mobility  Bed Mobility:  (independent log rolling supine <-> sit)  Transfers  Transfer:  (Independent sit to stand without device from chair, bed and standard height toilet.)  Educated re: car transfer techs with good understanding verbalized.    Ambulation/Gait Training:  Ambulation/Gait Training  Ambulation/Gait Training Performed:  independent functional mobility without device     Extremities: RUE   RUE : Within Functional Limits and LUE   LUE: Within Functional Limits        Outcome Measures:[facility] Daily Activity  Putting on and taking off regular lower body clothing: A little  Bathing (including washing, rinsing, drying): A little  Putting on and taking off regular upper body clothing: None  Toileting, which includes using toilet, bedpan or urinal: None  Taking care of personal grooming such as brushing teeth: None  Eating Meals: None  Daily Activity - Total Score: 22        Education Documentation  Body Mechanics, taught by Diana Weldon OT at 11/30/2023 10:56  AM.  Learner: Patient  Readiness: Acceptance  Method: Explanation, Demonstration  Response: Verbalizes Understanding, Demonstrated Understanding    Precautions, taught by Diana Weldon OT at 11/30/2023 10:56 AM.  Learner: Patient  Readiness: Acceptance  Method: Explanation, Demonstration  Response: Verbalizes Understanding, Demonstrated Understanding    ADL Training, taught by Diana Weldon OT at 11/30/2023 10:56 AM.  Learner: Patient  Readiness: Acceptance  Method: Explanation, Demonstration  Response: Verbalizes Understanding, Demonstrated Understanding    Education Comments  No comments found.        EDUCATION:  Education  Individual(s) Educated: Patient  Education Provided:  (back safety/spinal precautions)  Patient/Caregiver Demonstrated Understanding: yes

## 2023-11-30 NOTE — DISCHARGE SUMMARY
Discharge Diagnosis  Neurogenic claudication due to lumbar spinal stenosis    Issues Requiring Follow-Up  Surgical incision evaluation    Test Results Pending At Discharge  Pending Labs       No current pending labs.            Hospital Course   Admitted for lumbar decompression  Underwent L4 - S1 laminectomy on 11/29/2023  No post operative complications  Drain removed on POD#1  Discharged home on POD #1    Pertinent Physical Exam At Time of Discharge  Physical Exam  Sitting up in bedside chair.   Reports ambulating in halls with staff last evening and did well  Still with some RLE symptoms, but overall improved  Surgical Drain output 70 + 50 mL  Drain Removed without incident  Lumbar incision is well approximated, clean / dry / intact  Voiding QS  Tolerating oral intake well  Home Medications     Medication List      ASK your doctor about these medications     amLODIPine 10 mg tablet; Commonly known as: Norvasc   azelastine 0.05 % ophthalmic solution; Commonly known as: Optivar   DULoxetine 60 mg DR capsule; Commonly known as: Cymbalta   FreeStyle Lite Strips strip; Generic drug: blood sugar diagnostic   gabapentin 300 mg capsule; Commonly known as: Neurontin   ibuprofen 800 mg tablet   montelukast 10 mg tablet; Commonly known as: Singulair   Ozempic 2 mg/dose (8 mg/3 mL) pen injector; Generic drug: semaglutide   potassium chloride CR 10 mEq ER tablet; Commonly known as: Klor-Con   valsartan-hydrochlorothiazide 160-12.5 mg tablet; Commonly known as:   Diovan-HCT   zolpidem CR 12.5 mg ER tablet; Commonly known as: Ambien CR       Outpatient Follow-Up  Future Appointments   Date Time Provider Department Center   12/14/2023 11:00 AM MADI Oviedo [id number] [place]   1/19/2024 11:45 AM Saji Echavarria MD [id number] [place]   2/22/2024  9:30 AM Saji Echavarria MD [id number] [place]       TYLER Oviedo-CNP

## 2023-11-30 NOTE — PROGRESS NOTES
Physical Therapy    Physical Therapy    Physical Therapy Evaluation    Patient Name: Katlyn Jeff  MRN: [id number]  Today's Date: 11/30/2023   Time Calculation  Start Time: 0913  Stop Time: 0933  Time Calculation (min): 20 min    Assessment/Plan   PT Assessment  Evaluation/Treatment Tolerance:  (good)  End of Session Communication: Bedside nurse  End of Session Patient Position:  (siting eob w/ ot present)  IP OR SWING BED PT PLAN  Inpatient or Swing Bed: Inpatient  PT Plan  PT Plan: PT Eval only  PT Eval Only Reason: At baseline function  PT Discharge Recommendations:  (home initial prn assist)  Equipment Recommended upon Discharge:  (sc prn for out in community)  PT - OK to Discharge: Yes    Subjective     Current Problem:  Patient Active Problem List   Diagnosis    Bilateral myopia    Bilateral presbyopia    Diabetes mellitus type 2 without retinopathy (CMS/HCC)    Neurogenic claudication due to lumbar spinal stenosis    Idiopathic scoliosis and kyphoscoliosis    Spondylolisthesis, grade 1    Scoliosis, congenital    History of general anesthesia       General Visit Information:  General  Reason for Referral:  (11/29 l4-5, l5-s1 decompression, lumbar lami)  Prior to Session Communication: Bedside nurse  Patient Position Received: Up in chair, Alarm off, not on at start of session  General Comment: pt donned and doffed lumbar brace w/ vc, lumbar back used for ambulation activities    Home Living:  Home Living  Type of Home: Apartment (2 steps in w/ bhr, elevator to apt)    Prior Level of Function:  Prior Function Per Pt/Caregiver Report  Level of [place]:  (indep w/o ad, drives works)  Homemaking Assistance:  (indep)    Precautions:       Vital Signs:     Objective     Pain:  Pain Assessment  Pain Assessment:  (1-1/10 back)    Cognition:  Cognition  Overall Cognitive Status: Within Functional Limits    General Assessments:         Sensation  Sensation Comment:  (no n&t)        Functional Assessments:     Bed  Mobility  Bed Mobility:  (indep log roll)  Transfers  Transfer:  (indep)  Ambulation/Gait Training  Ambulation/Gait Training Performed:  (indep w/o ad and indep w/ sc, `120ft x2)  Stairs  Stairs:  (4 step w/ either  2 hr or sc abd 1 hr supervised as pt now wearing lumbar brace)       Extremity/Trunk Assessments:        RLE   RLE : Within Functional Limits  LLE   LLE : Within Functional Limits    Outcome Measures:  [facility] Basic Mobility  Turning from your back to your side while in a flat bed without using bedrails: None  Moving from lying on your back to sitting on the side of a flat bed without using bedrails: None  Moving to and from bed to chair (including a wheelchair): None  Standing up from a chair using your arms (e.g. wheelchair or bedside chair): None  To walk in hospital room: None  Climbing 3-5 steps with railing: A little  Basic Mobility - Total Score: 23                            Goals:  Encounter Problems       Encounter Problems (Active)       Pain - Adult            Education Documentation  No documentation found.  Education Comments  No comments found.

## 2023-11-30 NOTE — CARE PLAN
Problem: Pain - Adult  Goal: Verbalizes/displays adequate comfort level or baseline comfort level  Outcome: Progressing     Problem: Safety - Adult  Goal: Free from fall injury  Outcome: Progressing     Problem: Discharge Planning  Goal: Discharge to home or other facility with appropriate resources  Outcome: Progressing     Problem: Chronic Conditions and Co-morbidities  Goal: Patient's chronic conditions and co-morbidity symptoms are monitored and maintained or improved  Outcome: Progressing     Problem: Diabetes  Goal: Achieve decreasing blood glucose levels by end of shift  Outcome: Progressing  Goal: Increase stability of blood glucose readings by end of shift  Outcome: Progressing  Goal: Decrease in ketones present in urine by end of shift  Outcome: Progressing  Goal: Maintain electrolyte levels within acceptable range throughout shift  Outcome: Progressing  Goal: Maintain glucose levels >70mg/dl to <250mg/dl throughout shift  Outcome: Progressing  Goal: No changes in neurological exam by end of shift  Outcome: Progressing  Goal: Learn about and adhere to nutrition recommendations by end of shift  Outcome: Progressing  Goal: Vital signs within normal range for age by end of shift  Outcome: Progressing  Goal: Increase self care and/or family involovement by end of shift  Outcome: Progressing  Goal: Receive DSME education by end of shift  Outcome: Progressing     Problem: Pain  Goal: Takes deep breaths with improved pain control throughout the shift  Outcome: Progressing  Goal: Turns in bed with improved pain control throughout the shift  Outcome: Progressing  Goal: Walks with improved pain control throughout the shift  Outcome: Progressing  Goal: Performs ADL's with improved pain control throughout shift  Outcome: Progressing  Goal: Participates in PT with improved pain control throughout the shift  Outcome: Progressing  Goal: Free from opioid side effects throughout the shift  Outcome: Progressing  Goal:  Free from acute confusion related to pain meds throughout the shift  Outcome: Progressing

## 2023-11-30 NOTE — CARE PLAN
Problem: Pain - Adult  Goal: Verbalizes/displays adequate comfort level or baseline comfort level  Outcome: Progressing     Problem: Safety - Adult  Goal: Free from fall injury  Outcome: Progressing     Problem: Discharge Planning  Goal: Discharge to home or other facility with appropriate resources  Outcome: Progressing     Problem: Chronic Conditions and Co-morbidities  Goal: Patient's chronic conditions and co-morbidity symptoms are monitored and maintained or improved  Outcome: Progressing     Problem: Diabetes  Goal: Achieve decreasing blood glucose levels by end of shift  Outcome: Progressing  Goal: Increase stability of blood glucose readings by end of shift  Outcome: Progressing  Goal: Decrease in ketones present in urine by end of shift  Outcome: Progressing  Goal: Maintain electrolyte levels within acceptable range throughout shift  Outcome: Progressing  Goal: Maintain glucose levels >70mg/dl to <250mg/dl throughout shift  Outcome: Progressing  Goal: No changes in neurological exam by end of shift  Outcome: Progressing  Goal: Learn about and adhere to nutrition recommendations by end of shift  Outcome: Progressing  Goal: Vital signs within normal range for age by end of shift  Outcome: Progressing  Goal: Increase self care and/or family involovement by end of shift  Outcome: Progressing  Goal: Receive DSME education by end of shift  Outcome: Progressing     Problem: Pain  Goal: Takes deep breaths with improved pain control throughout the shift  Outcome: Progressing  Goal: Turns in bed with improved pain control throughout the shift  Outcome: Progressing  Goal: Walks with improved pain control throughout the shift  Outcome: Progressing  Goal: Performs ADL's with improved pain control throughout shift  Outcome: Progressing  Goal: Participates in PT with improved pain control throughout the shift  Outcome: Progressing  Goal: Free from opioid side effects throughout the shift  Outcome: Progressing  Goal:  Free from acute confusion related to pain meds throughout the shift  Outcome: Progressing   The patient's goals for the shift include pain control    The clinical goals for the shift include pain control

## 2023-12-06 DIAGNOSIS — G89.18 ACUTE POST-OPERATIVE PAIN: ICD-10-CM

## 2023-12-06 DIAGNOSIS — Z98.890 HISTORY OF EXCISION OF LAMINA OF LUMBAR VERTEBRA FOR DECOMPRESSION OF SPINAL CORD: ICD-10-CM

## 2023-12-06 RX ORDER — OXYCODONE HYDROCHLORIDE 5 MG/1
5 TABLET ORAL EVERY 6 HOURS PRN
Qty: 28 TABLET | Refills: 0 | Status: SHIPPED | OUTPATIENT
Start: 2023-12-07 | End: 2023-12-08 | Stop reason: SDUPTHER

## 2023-12-08 RX ORDER — METHYLPREDNISOLONE 4 MG/1
TABLET ORAL
Qty: 21 TABLET | Refills: 0 | Status: SHIPPED | OUTPATIENT
Start: 2023-12-08 | End: 2023-12-15

## 2023-12-08 RX ORDER — CYCLOBENZAPRINE HCL 10 MG
10 TABLET ORAL 3 TIMES DAILY PRN
Qty: 30 TABLET | Refills: 1 | Status: SHIPPED | OUTPATIENT
Start: 2023-12-08 | End: 2024-03-26 | Stop reason: WASHOUT

## 2023-12-08 RX ORDER — OXYCODONE HYDROCHLORIDE 5 MG/1
5 TABLET ORAL EVERY 6 HOURS PRN
Qty: 28 TABLET | Refills: 0 | Status: SHIPPED | OUTPATIENT
Start: 2023-12-08 | End: 2024-03-26 | Stop reason: WASHOUT

## 2023-12-08 NOTE — PROGRESS NOTES
I have personally reviewed the OARRS report. No suspicious activity noted. This report is scanned into the electronic medical record. I have considered the risks of abuse, dependence, addiction and diversion.     Patient with recent history of major reconstructive surgery (L4 - S1 decompressive laminectomy on 11/29/2023, at [facility] by Dr. Saji Echavarria) necessitating narcotic medications at higher doses during the post-operative period of 6 weeks.     Since she is having increase in radicular symptoms since discharge home, discussed rationale for Medrol Dosepak (she has tolerated in the past). Rx sent to pharmacy of choice.    Will continue with close monitoring and short course refills.   Maryellen Fitch, APRN-CNP

## 2023-12-13 NOTE — PROGRESS NOTES
Katlyn Jeff is a 58 y.o. female who is here for her 1st post op visit. She underwent L4 - S1 laminectomy at [facility] on 11/29/2023, by Dr. Saji Echavarria. She was discharged home on 11/30/2023.    Since discharge from the hospital, she feels she is doing well. Medrol Dosepak has helped with initial post op radicular symptoms. She is tolerating pain medications. Activity level - she is able to complete ADLs without assistance.    Smoker: No  Anticoagulation: YES: NSAID use PRN (not currently taking)    On exam:  A&O x 3, speech clear / fluent  Respirations even / unlabored  JOHNSON readily.   SURGICAL INCISION is: well approximated, no erythema / swelling / drainage  Gait is steady    Katlyn Jeff is progressing well post operatively. She agrees to follow up with Dr. Echavarria as previously scheduled, 01/19/2024.

## 2023-12-14 ENCOUNTER — OFFICE VISIT (OUTPATIENT)
Dept: NEUROSURGERY | Facility: CLINIC | Age: 58
End: 2023-12-14
Payer: COMMERCIAL

## 2023-12-14 VITALS
TEMPERATURE: 97.5 F | HEIGHT: 63 IN | BODY MASS INDEX: 32.07 KG/M2 | SYSTOLIC BLOOD PRESSURE: 102 MMHG | WEIGHT: 181 LBS | HEART RATE: 95 BPM | DIASTOLIC BLOOD PRESSURE: 70 MMHG

## 2023-12-14 DIAGNOSIS — Z98.890 HISTORY OF EXCISION OF LAMINA OF LUMBAR VERTEBRA FOR DECOMPRESSION OF SPINAL CORD: Primary | ICD-10-CM

## 2023-12-14 PROCEDURE — 1036F TOBACCO NON-USER: CPT | Performed by: NURSE PRACTITIONER

## 2023-12-14 PROCEDURE — 3074F SYST BP LT 130 MM HG: CPT | Performed by: NURSE PRACTITIONER

## 2023-12-14 PROCEDURE — 99024 POSTOP FOLLOW-UP VISIT: CPT | Performed by: NURSE PRACTITIONER

## 2023-12-14 PROCEDURE — 3078F DIAST BP <80 MM HG: CPT | Performed by: NURSE PRACTITIONER

## 2023-12-14 ASSESSMENT — PATIENT HEALTH QUESTIONNAIRE - PHQ9
1. LITTLE INTEREST OR PLEASURE IN DOING THINGS: NOT AT ALL
2. FEELING DOWN, DEPRESSED OR HOPELESS: NOT AT ALL
SUM OF ALL RESPONSES TO PHQ9 QUESTIONS 1 & 2: 0

## 2023-12-14 ASSESSMENT — PAIN SCALES - GENERAL: PAINLEVEL: 3

## 2023-12-14 ASSESSMENT — LIFESTYLE VARIABLES: HOW OFTEN DO YOU HAVE A DRINK CONTAINING ALCOHOL: NEVER

## 2023-12-27 ENCOUNTER — APPOINTMENT (OUTPATIENT)
Dept: RADIOLOGY | Facility: HOSPITAL | Age: 58
End: 2023-12-27
Payer: COMMERCIAL

## 2023-12-27 ENCOUNTER — HOSPITAL ENCOUNTER (EMERGENCY)
Facility: HOSPITAL | Age: 58
Discharge: HOME | End: 2023-12-27
Attending: STUDENT IN AN ORGANIZED HEALTH CARE EDUCATION/TRAINING PROGRAM
Payer: COMMERCIAL

## 2023-12-27 VITALS
HEIGHT: 63 IN | BODY MASS INDEX: 31.88 KG/M2 | TEMPERATURE: 98.4 F | DIASTOLIC BLOOD PRESSURE: 85 MMHG | RESPIRATION RATE: 16 BRPM | SYSTOLIC BLOOD PRESSURE: 124 MMHG | OXYGEN SATURATION: 94 % | HEART RATE: 93 BPM | WEIGHT: 179.9 LBS

## 2023-12-27 DIAGNOSIS — G89.18 POST-OP PAIN: Primary | ICD-10-CM

## 2023-12-27 LAB
ALBUMIN SERPL BCP-MCNC: 4.3 G/DL (ref 3.4–5)
ALP SERPL-CCNC: 72 U/L (ref 33–136)
ALT SERPL W P-5'-P-CCNC: 11 U/L (ref 7–45)
ANION GAP BLDV CALCULATED.4IONS-SCNC: 7 MMOL/L (ref 10–25)
ANION GAP SERPL CALC-SCNC: 14 MMOL/L (ref 10–20)
APPEARANCE UR: CLEAR
AST SERPL W P-5'-P-CCNC: 17 U/L (ref 9–39)
BASE EXCESS BLDV CALC-SCNC: 6.2 MMOL/L (ref -2–3)
BASOPHILS # BLD AUTO: 0.05 X10*3/UL (ref 0–0.1)
BASOPHILS NFR BLD AUTO: 0.5 %
BILIRUB SERPL-MCNC: 0.9 MG/DL (ref 0–1.2)
BILIRUB UR STRIP.AUTO-MCNC: NEGATIVE MG/DL
BODY TEMPERATURE: 37 DEGREES CELSIUS
BUN SERPL-MCNC: 12 MG/DL (ref 6–23)
CA-I BLDV-SCNC: 1.19 MMOL/L (ref 1.1–1.33)
CALCIUM SERPL-MCNC: 9.8 MG/DL (ref 8.6–10.6)
CHLORIDE BLDV-SCNC: 102 MMOL/L (ref 98–107)
CHLORIDE SERPL-SCNC: 102 MMOL/L (ref 98–107)
CO2 SERPL-SCNC: 28 MMOL/L (ref 21–32)
COLOR UR: YELLOW
CREAT SERPL-MCNC: 0.72 MG/DL (ref 0.5–1.05)
CRP SERPL-MCNC: 1.59 MG/DL
EOSINOPHIL # BLD AUTO: 0.08 X10*3/UL (ref 0–0.7)
EOSINOPHIL NFR BLD AUTO: 0.8 %
ERYTHROCYTE [DISTWIDTH] IN BLOOD BY AUTOMATED COUNT: 12.4 % (ref 11.5–14.5)
ERYTHROCYTE [SEDIMENTATION RATE] IN BLOOD BY WESTERGREN METHOD: 25 MM/H (ref 0–30)
GFR SERPL CREATININE-BSD FRML MDRD: >90 ML/MIN/1.73M*2
GLUCOSE BLDV-MCNC: 120 MG/DL (ref 74–99)
GLUCOSE SERPL-MCNC: 112 MG/DL (ref 74–99)
GLUCOSE UR STRIP.AUTO-MCNC: NEGATIVE MG/DL
HCO3 BLDV-SCNC: 31.8 MMOL/L (ref 22–26)
HCT VFR BLD AUTO: 38 % (ref 36–46)
HCT VFR BLD EST: 38 % (ref 36–46)
HGB BLD-MCNC: 12.9 G/DL (ref 12–16)
HGB BLDV-MCNC: 12.7 G/DL (ref 12–16)
HOLD SPECIMEN: NORMAL
IMM GRANULOCYTES # BLD AUTO: 0.04 X10*3/UL (ref 0–0.7)
IMM GRANULOCYTES NFR BLD AUTO: 0.4 % (ref 0–0.9)
INHALED O2 CONCENTRATION: 21 %
KETONES UR STRIP.AUTO-MCNC: NEGATIVE MG/DL
LACTATE BLDV-SCNC: 0.7 MMOL/L (ref 0.4–2)
LEUKOCYTE ESTERASE UR QL STRIP.AUTO: ABNORMAL
LYMPHOCYTES # BLD AUTO: 2.07 X10*3/UL (ref 1.2–4.8)
LYMPHOCYTES NFR BLD AUTO: 19.9 %
MCH RBC QN AUTO: 30.7 PG (ref 26–34)
MCHC RBC AUTO-ENTMCNC: 33.9 G/DL (ref 32–36)
MCV RBC AUTO: 91 FL (ref 80–100)
MONOCYTES # BLD AUTO: 0.75 X10*3/UL (ref 0.1–1)
MONOCYTES NFR BLD AUTO: 7.2 %
MUCOUS THREADS #/AREA URNS AUTO: ABNORMAL /LPF
NEUTROPHILS # BLD AUTO: 7.43 X10*3/UL (ref 1.2–7.7)
NEUTROPHILS NFR BLD AUTO: 71.2 %
NITRITE UR QL STRIP.AUTO: NEGATIVE
NRBC BLD-RTO: 0 /100 WBCS (ref 0–0)
OXYHGB MFR BLDV: 19.8 % (ref 45–75)
PCO2 BLDV: 49 MM HG (ref 41–51)
PH BLDV: 7.42 PH (ref 7.33–7.43)
PH UR STRIP.AUTO: 5 [PH]
PLATELET # BLD AUTO: 282 X10*3/UL (ref 150–450)
PO2 BLDV: 20 MM HG (ref 35–45)
POTASSIUM BLDV-SCNC: 4.3 MMOL/L (ref 3.5–5.3)
POTASSIUM SERPL-SCNC: 3.7 MMOL/L (ref 3.5–5.3)
PROT SERPL-MCNC: 7.1 G/DL (ref 6.4–8.2)
PROT UR STRIP.AUTO-MCNC: NEGATIVE MG/DL
RBC # BLD AUTO: 4.2 X10*6/UL (ref 4–5.2)
RBC # UR STRIP.AUTO: ABNORMAL /UL
RBC #/AREA URNS AUTO: ABNORMAL /HPF
SAO2 % BLDV: 20 % (ref 45–75)
SODIUM BLDV-SCNC: 136 MMOL/L (ref 136–145)
SODIUM SERPL-SCNC: 140 MMOL/L (ref 136–145)
SP GR UR STRIP.AUTO: 1.02
UROBILINOGEN UR STRIP.AUTO-MCNC: <2 MG/DL
WBC # BLD AUTO: 10.4 X10*3/UL (ref 4.4–11.3)
WBC #/AREA URNS AUTO: ABNORMAL /HPF

## 2023-12-27 PROCEDURE — 99284 EMERGENCY DEPT VISIT MOD MDM: CPT | Performed by: STUDENT IN AN ORGANIZED HEALTH CARE EDUCATION/TRAINING PROGRAM

## 2023-12-27 PROCEDURE — 96374 THER/PROPH/DIAG INJ IV PUSH: CPT

## 2023-12-27 PROCEDURE — 99285 EMERGENCY DEPT VISIT HI MDM: CPT | Performed by: STUDENT IN AN ORGANIZED HEALTH CARE EDUCATION/TRAINING PROGRAM

## 2023-12-27 PROCEDURE — 87040 BLOOD CULTURE FOR BACTERIA: CPT | Performed by: STUDENT IN AN ORGANIZED HEALTH CARE EDUCATION/TRAINING PROGRAM

## 2023-12-27 PROCEDURE — 72100 X-RAY EXAM L-S SPINE 2/3 VWS: CPT

## 2023-12-27 PROCEDURE — 72100 X-RAY EXAM L-S SPINE 2/3 VWS: CPT | Performed by: RADIOLOGY

## 2023-12-27 PROCEDURE — 84132 ASSAY OF SERUM POTASSIUM: CPT | Performed by: STUDENT IN AN ORGANIZED HEALTH CARE EDUCATION/TRAINING PROGRAM

## 2023-12-27 PROCEDURE — 85025 COMPLETE CBC W/AUTO DIFF WBC: CPT | Performed by: STUDENT IN AN ORGANIZED HEALTH CARE EDUCATION/TRAINING PROGRAM

## 2023-12-27 PROCEDURE — 2500000001 HC RX 250 WO HCPCS SELF ADMINISTERED DRUGS (ALT 637 FOR MEDICARE OP): Performed by: STUDENT IN AN ORGANIZED HEALTH CARE EDUCATION/TRAINING PROGRAM

## 2023-12-27 PROCEDURE — 84132 ASSAY OF SERUM POTASSIUM: CPT | Mod: 59 | Performed by: STUDENT IN AN ORGANIZED HEALTH CARE EDUCATION/TRAINING PROGRAM

## 2023-12-27 PROCEDURE — 36415 COLL VENOUS BLD VENIPUNCTURE: CPT | Performed by: STUDENT IN AN ORGANIZED HEALTH CARE EDUCATION/TRAINING PROGRAM

## 2023-12-27 PROCEDURE — 86140 C-REACTIVE PROTEIN: CPT | Performed by: STUDENT IN AN ORGANIZED HEALTH CARE EDUCATION/TRAINING PROGRAM

## 2023-12-27 PROCEDURE — 81001 URINALYSIS AUTO W/SCOPE: CPT | Performed by: STUDENT IN AN ORGANIZED HEALTH CARE EDUCATION/TRAINING PROGRAM

## 2023-12-27 PROCEDURE — 85652 RBC SED RATE AUTOMATED: CPT | Performed by: STUDENT IN AN ORGANIZED HEALTH CARE EDUCATION/TRAINING PROGRAM

## 2023-12-27 PROCEDURE — 96375 TX/PRO/DX INJ NEW DRUG ADDON: CPT

## 2023-12-27 PROCEDURE — 2500000004 HC RX 250 GENERAL PHARMACY W/ HCPCS (ALT 636 FOR OP/ED): Performed by: STUDENT IN AN ORGANIZED HEALTH CARE EDUCATION/TRAINING PROGRAM

## 2023-12-27 PROCEDURE — 36415 COLL VENOUS BLD VENIPUNCTURE: CPT | Performed by: EMERGENCY MEDICINE

## 2023-12-27 RX ORDER — OXYCODONE AND ACETAMINOPHEN 5; 325 MG/1; MG/1
1 TABLET ORAL ONCE
Status: COMPLETED | OUTPATIENT
Start: 2023-12-27 | End: 2023-12-27

## 2023-12-27 RX ORDER — METHOCARBAMOL 100 MG/ML
1000 INJECTION, SOLUTION INTRAMUSCULAR; INTRAVENOUS ONCE
Status: COMPLETED | OUTPATIENT
Start: 2023-12-27 | End: 2023-12-27

## 2023-12-27 RX ORDER — HYDROMORPHONE HYDROCHLORIDE 1 MG/ML
0.5 INJECTION, SOLUTION INTRAMUSCULAR; INTRAVENOUS; SUBCUTANEOUS ONCE
Status: COMPLETED | OUTPATIENT
Start: 2023-12-27 | End: 2023-12-27

## 2023-12-27 RX ADMIN — HYDROMORPHONE HYDROCHLORIDE 0.5 MG: 1 INJECTION, SOLUTION INTRAMUSCULAR; INTRAVENOUS; SUBCUTANEOUS at 19:14

## 2023-12-27 RX ADMIN — METHOCARBAMOL 1000 MG: 1000 INJECTION, SOLUTION INTRAMUSCULAR; INTRAVENOUS at 19:15

## 2023-12-27 RX ADMIN — OXYCODONE HYDROCHLORIDE AND ACETAMINOPHEN 1 TABLET: 5; 325 TABLET ORAL at 22:07

## 2023-12-27 ASSESSMENT — COLUMBIA-SUICIDE SEVERITY RATING SCALE - C-SSRS
2. HAVE YOU ACTUALLY HAD ANY THOUGHTS OF KILLING YOURSELF?: NO
1. IN THE PAST MONTH, HAVE YOU WISHED YOU WERE DEAD OR WISHED YOU COULD GO TO SLEEP AND NOT WAKE UP?: NO
6. HAVE YOU EVER DONE ANYTHING, STARTED TO DO ANYTHING, OR PREPARED TO DO ANYTHING TO END YOUR LIFE?: NO

## 2023-12-27 ASSESSMENT — ENCOUNTER SYMPTOMS
FEVER: 0
VOMITING: 0
ACTIVITY CHANGE: 0
BACK PAIN: 1
NAUSEA: 0
ABDOMINAL PAIN: 0
ABDOMINAL DISTENTION: 0
CHILLS: 0
APPETITE CHANGE: 0
DIAPHORESIS: 0
SHORTNESS OF BREATH: 0
DIARRHEA: 0
CHEST TIGHTNESS: 0

## 2023-12-27 ASSESSMENT — LIFESTYLE VARIABLES
EVER FELT BAD OR GUILTY ABOUT YOUR DRINKING: NO
EVER HAD A DRINK FIRST THING IN THE MORNING TO STEADY YOUR NERVES TO GET RID OF A HANGOVER: NO
HAVE PEOPLE ANNOYED YOU BY CRITICIZING YOUR DRINKING: NO
REASON UNABLE TO ASSESS: NO
HAVE YOU EVER FELT YOU SHOULD CUT DOWN ON YOUR DRINKING: NO

## 2023-12-27 ASSESSMENT — PAIN DESCRIPTION - FREQUENCY: FREQUENCY: CONSTANT/CONTINUOUS

## 2023-12-27 ASSESSMENT — PAIN DESCRIPTION - LOCATION: LOCATION: BACK

## 2023-12-27 ASSESSMENT — PAIN - FUNCTIONAL ASSESSMENT
PAIN_FUNCTIONAL_ASSESSMENT: 0-10
PAIN_FUNCTIONAL_ASSESSMENT: 0-10

## 2023-12-27 ASSESSMENT — PAIN DESCRIPTION - ORIENTATION: ORIENTATION: LOWER

## 2023-12-27 ASSESSMENT — PAIN DESCRIPTION - DESCRIPTORS: DESCRIPTORS: ACHING

## 2023-12-27 ASSESSMENT — PAIN SCALES - GENERAL: PAINLEVEL_OUTOF10: 10 - WORST POSSIBLE PAIN

## 2023-12-27 ASSESSMENT — PAIN DESCRIPTION - ONSET: ONSET: ONGOING

## 2023-12-27 NOTE — CONSULTS
Consults    Reason For Consult  Concern for back wound    History Of Present Illness  Katlyn Jeff is a 58 y.o. female presenting with concerns with her back wound following L4-L5, L5, and S1 decompressive lumbar laminectomy on 11/29/23 with Dr. Saji Echavarria. The patient was discharged home POD1 without complications. The patient was seen 12/14 at a post-op visit and was noted to be doing well with minimal complaints. The patient presents today with wound/back pain that started approximately 2 days ago that is sharp and achy. The patient also notes that she has right leg/thigh pain but that the pain is not continuously radiating from her back. The patient denies noticing any wound drainage. The patient sent pictures to Dr. Echvaarria who informed her to present to the ED for concerns of wound infection. The patient denies any systemic signs and symptoms of sepsis. The patient denies nausea, vomiting, diarrhea, fevers, chills, noticeable weight loss. The patient's main concern is pain.      Past Medical History  She has a past medical history of Arthritis, Dysfunctional uterine bleeding, GERD (gastroesophageal reflux disease), Hepatitis C, History of blood transfusion, HPV in female, Hypertension, Nephrolithiasis, Personal history of other diseases of the circulatory system, Personal history of other diseases of the musculoskeletal system and connective tissue, Personal history of other diseases of the musculoskeletal system and connective tissue, Personal history of other endocrine, nutritional and metabolic disease, Personal history of other infectious and parasitic diseases, Personal history of other mental and behavioral disorders, Scoliosis, Type 2 diabetes mellitus without complications (CMS/HCC), and Vision loss.    Surgical History  She has a past surgical history that includes Other surgical history (10/17/2022); Other surgical history (10/17/2022); Cholecystectomy; Tonsillectomy; Colonoscopy; Upper  gastrointestinal endoscopy; and Spinal fusion.     Social History  She reports that she has never smoked. She has never used smokeless tobacco. She reports current alcohol use. She reports that she does not use drugs.    Family History  Family History   Problem Relation Name Age of Onset    Other (Chronic primary angle-closure of right eye, moderate stage) Mother      Diabetes Mother      Hypertension Mother      Stomach cancer Father      Stomach cancer Maternal Grandmother      Stomach cancer Paternal Grandmother      Diabetes Other Maternal relatives         Medications  Current Outpatient Medications   Medication Instructions    acetaminophen (TYLENOL) 975 mg, oral, Every 8 hours PRN    amLODIPine (NORVASC) 10 mg, oral, Daily    cyclobenzaprine (FLEXERIL) 10 mg, oral, 3 times daily PRN    docusate sodium (COLACE) 100 mg, oral, 2 times daily PRN    DULoxetine (CYMBALTA) 60 mg, oral, Daily    FreeStyle Lite Strips strip USE TO TEST EVERY DAY    gabapentin (NEURONTIN) 300 mg, oral, 2 times daily    ibuprofen 800 mg, oral, Every 8 hours PRN    montelukast (SINGULAIR) 10 mg, oral, Nightly    oxyCODONE (ROXICODONE) 5 mg, oral, Every 6 hours PRN    Ozempic 2 mg, subcutaneous, Weekly    potassium chloride CR 10 mEq ER tablet 10 mEq, oral, Daily    valsartan-hydrochlorothiazide (Diovan-HCT) 160-12.5 mg tablet 1 tablet, Daily    zolpidem CR (AMBIEN CR) 12.5 mg, oral, Nightly PRN, Do not crush, chew, or split.        Allergies  Adhesive tape-silicones; Aloe vera latex gloves [gloves, latex with aloe vera]; Codeine; and Macrolide antibiotics    Review of Systems   Constitutional:  Negative for activity change, appetite change, chills, diaphoresis and fever.   Eyes:  Negative for visual disturbance.   Respiratory:  Negative for chest tightness and shortness of breath.    Cardiovascular:  Negative for chest pain.   Gastrointestinal:  Negative for abdominal distention, abdominal pain, diarrhea, nausea and vomiting.  "  Musculoskeletal:  Positive for back pain.        Physical Exam  Constitutional:       General: She is not in acute distress.     Appearance: Normal appearance.   HENT:      Head: Normocephalic and atraumatic.   Eyes:      Extraocular Movements: Extraocular movements intact.      Pupils: Pupils are equal, round, and reactive to light.   Cardiovascular:      Rate and Rhythm: Normal rate and regular rhythm.   Pulmonary:      Effort: Pulmonary effort is normal. No respiratory distress.   Abdominal:      General: Abdomen is flat.      Palpations: Abdomen is soft.   Musculoskeletal:         General: Normal range of motion.      Cervical back: Normal range of motion.      Comments: Pain around R knee and thigh  No pain to palpation of back wound. No drainage noted. No fluctuance   Neurological:      Mental Status: She is alert.          Last Recorded Vitals  Blood pressure 112/80, pulse 110, temperature 36.9 °C (98.4 °F), resp. rate 20, height 1.6 m (5' 3\"), weight 81.6 kg (179 lb 14.3 oz), SpO2 97 %.    Relevant Results  Results for orders placed or performed during the hospital encounter of 12/27/23 (from the past 24 hour(s))   CBC and Auto Differential   Result Value Ref Range    WBC 10.4 4.4 - 11.3 x10*3/uL    nRBC 0.0 0.0 - 0.0 /100 WBCs    RBC 4.20 4.00 - 5.20 x10*6/uL    Hemoglobin 12.9 12.0 - 16.0 g/dL    Hematocrit 38.0 36.0 - 46.0 %    MCV 91 80 - 100 fL    MCH 30.7 26.0 - 34.0 pg    MCHC 33.9 32.0 - 36.0 g/dL    RDW 12.4 11.5 - 14.5 %    Platelets 282 150 - 450 x10*3/uL    Neutrophils % 71.2 40.0 - 80.0 %    Immature Granulocytes %, Automated 0.4 0.0 - 0.9 %    Lymphocytes % 19.9 13.0 - 44.0 %    Monocytes % 7.2 2.0 - 10.0 %    Eosinophils % 0.8 0.0 - 6.0 %    Basophils % 0.5 0.0 - 2.0 %    Neutrophils Absolute 7.43 1.20 - 7.70 x10*3/uL    Immature Granulocytes Absolute, Automated 0.04 0.00 - 0.70 x10*3/uL    Lymphocytes Absolute 2.07 1.20 - 4.80 x10*3/uL    Monocytes Absolute 0.75 0.10 - 1.00 x10*3/uL    " Eosinophils Absolute 0.08 0.00 - 0.70 x10*3/uL    Basophils Absolute 0.05 0.00 - 0.10 x10*3/uL   Sedimentation rate, automated   Result Value Ref Range    Sedimentation Rate 25 0 - 30 mm/h   BLOOD GAS VENOUS FULL PANEL   Result Value Ref Range    POCT pH, Venous 7.42 7.33 - 7.43 pH    POCT pCO2, Venous 49 41 - 51 mm Hg    POCT pO2, Venous 20 (L) 35 - 45 mm Hg    POCT SO2, Venous 20 (L) 45 - 75 %    POCT Oxy Hemoglobin, Venous 19.8 (L) 45.0 - 75.0 %    POCT Hematocrit Calculated, Venous 38.0 36.0 - 46.0 %    POCT Sodium, Venous 136 136 - 145 mmol/L    POCT Potassium, Venous 4.3 3.5 - 5.3 mmol/L    POCT Chloride, Venous 102 98 - 107 mmol/L    POCT Ionized Calicum, Venous 1.19 1.10 - 1.33 mmol/L    POCT Glucose, Venous 120 (H) 74 - 99 mg/dL    POCT Lactate, Venous 0.7 0.4 - 2.0 mmol/L    POCT Base Excess, Venous 6.2 (H) -2.0 - 3.0 mmol/L    POCT HCO3 Calculated, Venous 31.8 (H) 22.0 - 26.0 mmol/L    POCT Hemoglobin, Venous 12.7 12.0 - 16.0 g/dL    POCT Anion Gap, Venous 7.0 (L) 10.0 - 25.0 mmol/L    Patient Temperature 37.0 degrees Celsius    FiO2 21 %          Assessment/Plan     Katlyn Jeff is a 58 y.o. female presenting with concerns with her back wound following L4-L5, L5, and S1 decompressive lumbar laminectomy on 11/29/23 with Dr. Saji Echavarria. The patient is presenting with back pain related to her back wound with concerns for wound infection. The patient denies any systemic signs of infection or sepsis and her wound look clean, dry, and intact with mild erythema.     Recs:  - ESR  - CRP  - Labs    Plan of care discussed with chief resident and attending Dr. Villarreal.        Angi Linares MD

## 2023-12-27 NOTE — Clinical Note
Katlyn Jeff was seen and treated in our emergency department on 12/27/2023.  She may return to work on 12/28/2023.       If you have any questions or concerns, please don't hesitate to call.      Chandler Hernandez MD

## 2023-12-27 NOTE — ED TRIAGE NOTES
Pt to ed after having decompression and laminectomy from  L5-s1 by dr razo at [facility]. No loss of bowel or bladder and no numbness or tingling appreciated. Pt states she woke up two days ago and had excruciating pain. She contacted her dr and he told her to come here for possible infection. No trauma or falls reported. Msps intact

## 2023-12-27 NOTE — ED PROVIDER NOTES
CC: Post-op Problem     HPI:  Patient is a 58-year-old female with past medical history as noted below who is presenting to the emergency department after L5-S1 spinal procedure by dr razo at [facility]. No loss of bowel or bladder and no numbness or tingling appreciated. Pt states she woke up two days ago and had excruciating pain. She contacted her dr and he told her to come here for possible infection.    On my evaluation patient reports that pain has been worsening since yesterday has noticed slight erythema around her surgical site.  Patient states that she reached out to her neurosurgical team her who requested she present to the emergency department for further evaluation.  She denies any fevers, no slight chills, denies any new changes in bowel bladder function, denies any focal numbness or weakness about her arms, legs, face does note that she has increased pain with ambulation and when getting up from bed.    Records Reviewed:  Recent available ED and inpatient notes reviewed in EMR.    PMHx/PSHx:  Per HPI.   - has a past medical history of Arthritis, Dysfunctional uterine bleeding, GERD (gastroesophageal reflux disease), Hepatitis C, History of blood transfusion, HPV in female, Hypertension, Nephrolithiasis, Personal history of other diseases of the circulatory system, Personal history of other diseases of the musculoskeletal system and connective tissue, Personal history of other diseases of the musculoskeletal system and connective tissue, Personal history of other endocrine, nutritional and metabolic disease, Personal history of other infectious and parasitic diseases, Personal history of other mental and behavioral disorders, Scoliosis, Type 2 diabetes mellitus without complications (CMS/HCC), and Vision loss.  - has a past surgical history that includes Other surgical history (10/17/2022); Other surgical history (10/17/2022); Cholecystectomy; Tonsillectomy; Colonoscopy; Upper  gastrointestinal endoscopy; and Spinal fusion.    Medications:  Reviewed in EMR. See EMR for complete list of medications and doses.    Allergies:  Adhesive tape-silicones; Aloe vera latex gloves [gloves, latex with aloe vera]; Codeine; and Macrolide antibiotics    Social History:  - Tobacco:  reports that she has never smoked. She has never used smokeless tobacco.   - Alcohol:  reports current alcohol use.   - Illicit Drugs:  reports no history of drug use.     ROS:  Per HPI.       ???????????????????????????????????????????????????????????????  Triage Vitals:  T 36.9 °C (98.4 °F)    /80  RR 20  O2 97 %      Physical Exam  Vitals and nursing note reviewed.   Constitutional:       General: She is not in acute distress.     Appearance: Normal appearance. She is not toxic-appearing.   HENT:      Head: Normocephalic and atraumatic.      Nose: No congestion or rhinorrhea.      Mouth/Throat:      Mouth: Mucous membranes are moist.      Pharynx: Oropharynx is clear.   Eyes:      Extraocular Movements: Extraocular movements intact.      Conjunctiva/sclera: Conjunctivae normal.      Pupils: Pupils are equal, round, and reactive to light.   Cardiovascular:      Rate and Rhythm: Normal rate and regular rhythm.      Pulses: Normal pulses.      Heart sounds: No murmur heard.     No friction rub. No gallop.   Pulmonary:      Effort: Pulmonary effort is normal.      Breath sounds: No wheezing, rhonchi or rales.   Abdominal:      General: There is no distension.      Palpations: Abdomen is soft.      Tenderness: There is no abdominal tenderness. There is no guarding or rebound.   Musculoskeletal:         General: No swelling, deformity or signs of injury. Normal range of motion.      Cervical back: Normal range of motion.      Right lower leg: No edema.      Left lower leg: No edema.   Skin:     General: Skin is warm and dry.      Capillary Refill: Capillary refill takes less than 2 seconds.      Findings: No  bruising, lesion or rash.      Comments: Surgical incision that is approximately 4 cm in length there is minimal erythema surrounding, the wound is intact, there is no drainage that is noted on physical examination.   Neurological:      General: No focal deficit present.      Mental Status: She is alert and oriented to person, place, and time. Mental status is at baseline.      Cranial Nerves: No cranial nerve deficit.      Sensory: No sensory deficit.      Coordination: Coordination normal.   Psychiatric:         Mood and Affect: Mood normal.         Thought Content: Thought content normal.         Judgment: Judgment normal.       ??????????????????????????????????????????????????????????????    Assessment and Plan:  Patient is a 58-year-old female with past medical history as noted above who is 1 month postop from a lower lumbar spinal surgery.  She is presented emergency department for acute worsening pain at the surgical site.  On my evaluation of the surgical incision is clean dry and intact I discussed the patient case with neurosurgery who recommended laboratory evaluation.  Laboratory evaluation is pending at this time.  Please see ED course below for interpretation of lab results and context patient's clinical condition, additional consult recommendations and patient's eventual disposition.    ED Course:  ED Course as of 12/27/23 1735   Wed Dec 27, 2023   1716 Discussed patient case with neurosurgery neurosurgery agrees to evaluate the patient in the emergency department.,  Recommends laboratory workup which is ordered [BN]      ED Course User Index  [BN] Chandler Hernandez MD      After neurosurgery evaluation, review of labs which reveal no significant leukocytosis, minimal elevation in CRP, no elevation in ESR patient is determined to be appropriate for discharge.  She is discharged from the emergency department in stable condition with instructions to follow-up with spine surgery team.    Social  Determinants Limiting Care:      Disposition:  Discharge    Chandler Hernandez MD       Procedures ? SmartLinks last updated 12/27/2023 5:12 PM        Chandler Hernandez MD  Resident  12/30/23 0254

## 2023-12-28 LAB — HOLD SPECIMEN: NORMAL

## 2023-12-29 DIAGNOSIS — R31.21 ASYMPTOMATIC MICROSCOPIC HEMATURIA: Primary | ICD-10-CM

## 2023-12-29 RX ORDER — SULFAMETHOXAZOLE AND TRIMETHOPRIM 800; 160 MG/1; MG/1
1 TABLET ORAL 2 TIMES DAILY
Qty: 10 TABLET | Refills: 0 | Status: SHIPPED | OUTPATIENT
Start: 2023-12-29 | End: 2024-01-03

## 2023-12-29 NOTE — PROGRESS NOTES
Phone with patient. Her spine pain has improved slightly. Discussed hematuria, mild leukesterace on urinalysis. Discussed urine culture rationale. She states her pain is in similar region of previous renal stone, only more intense, and wishes to proceed with UTI treatment. She has tolerated sulfa antibiotic in the past. TMP / SMX DS sent to pharmacy of choice x 5 days. She agrees to treatment.  Maryellen Fitch, APRN-CNP

## 2023-12-31 LAB
BACTERIA BLD CULT: NORMAL
BACTERIA BLD CULT: NORMAL

## 2024-01-02 DIAGNOSIS — Z98.890 HISTORY OF EXCISION OF LAMINA OF LUMBAR VERTEBRA FOR DECOMPRESSION OF SPINAL CORD: ICD-10-CM

## 2024-01-02 DIAGNOSIS — M48.062 NEUROGENIC CLAUDICATION DUE TO LUMBAR SPINAL STENOSIS: Primary | ICD-10-CM

## 2024-01-03 DIAGNOSIS — Z98.890 HISTORY OF EXCISION OF LAMINA OF LUMBAR VERTEBRA FOR DECOMPRESSION OF SPINAL CORD: ICD-10-CM

## 2024-01-03 DIAGNOSIS — G89.18 ACUTE POST-OPERATIVE PAIN: ICD-10-CM

## 2024-01-03 RX ORDER — OXYCODONE AND ACETAMINOPHEN 5; 325 MG/1; MG/1
1 TABLET ORAL EVERY 6 HOURS PRN
Qty: 28 TABLET | Refills: 0 | Status: SHIPPED | OUTPATIENT
Start: 2024-01-03 | End: 2024-01-10

## 2024-01-03 NOTE — PROGRESS NOTES
I have personally reviewed the OARRS report. No suspicious activity noted. This report is scanned into the electronic medical record. I have considered the risks of abuse, dependence, addiction and diversion.     Patient with recent history of major reconstructive surgery necessitating narcotic medications at higher doses during the post-operative period of 6 weeks.     Will continue with close monitoring and short course refills.   Maryellen Fitch, APRN-CNP

## 2024-01-19 ENCOUNTER — APPOINTMENT (OUTPATIENT)
Dept: NEUROSURGERY | Facility: CLINIC | Age: 59
End: 2024-01-19
Payer: COMMERCIAL

## 2024-01-30 ENCOUNTER — HOSPITAL ENCOUNTER (OUTPATIENT)
Dept: RADIOLOGY | Facility: HOSPITAL | Age: 59
Discharge: HOME | End: 2024-01-30
Payer: COMMERCIAL

## 2024-01-30 DIAGNOSIS — Z98.890 HISTORY OF EXCISION OF LAMINA OF LUMBAR VERTEBRA FOR DECOMPRESSION OF SPINAL CORD: ICD-10-CM

## 2024-01-30 DIAGNOSIS — M48.062 NEUROGENIC CLAUDICATION DUE TO LUMBAR SPINAL STENOSIS: ICD-10-CM

## 2024-01-30 PROCEDURE — 72100 X-RAY EXAM L-S SPINE 2/3 VWS: CPT

## 2024-01-30 PROCEDURE — 72100 X-RAY EXAM L-S SPINE 2/3 VWS: CPT | Performed by: RADIOLOGY

## 2024-02-01 ENCOUNTER — OFFICE VISIT (OUTPATIENT)
Dept: NEUROSURGERY | Facility: CLINIC | Age: 59
End: 2024-02-01
Payer: COMMERCIAL

## 2024-02-01 VITALS
SYSTOLIC BLOOD PRESSURE: 110 MMHG | HEIGHT: 63 IN | TEMPERATURE: 97.5 F | BODY MASS INDEX: 31.54 KG/M2 | HEART RATE: 89 BPM | WEIGHT: 178 LBS | DIASTOLIC BLOOD PRESSURE: 58 MMHG

## 2024-02-01 DIAGNOSIS — S32.050D CLOSED COMPRESSION FRACTURE OF L5 LUMBAR VERTEBRA WITH ROUTINE HEALING, SUBSEQUENT ENCOUNTER: ICD-10-CM

## 2024-02-01 DIAGNOSIS — Z98.890 STATUS POST LUMBAR SURGERY: ICD-10-CM

## 2024-02-01 DIAGNOSIS — M48.062 NEUROGENIC CLAUDICATION DUE TO LUMBAR SPINAL STENOSIS: ICD-10-CM

## 2024-02-01 DIAGNOSIS — M41.20 IDIOPATHIC SCOLIOSIS AND KYPHOSCOLIOSIS: Primary | ICD-10-CM

## 2024-02-01 PROBLEM — S32.050A: Status: ACTIVE | Noted: 2024-02-01

## 2024-02-01 PROCEDURE — 3078F DIAST BP <80 MM HG: CPT | Performed by: NEUROLOGICAL SURGERY

## 2024-02-01 PROCEDURE — 99024 POSTOP FOLLOW-UP VISIT: CPT | Performed by: NEUROLOGICAL SURGERY

## 2024-02-01 PROCEDURE — 3074F SYST BP LT 130 MM HG: CPT | Performed by: NEUROLOGICAL SURGERY

## 2024-02-01 PROCEDURE — 1036F TOBACCO NON-USER: CPT | Performed by: NEUROLOGICAL SURGERY

## 2024-02-01 ASSESSMENT — PAIN SCALES - GENERAL: PAINLEVEL: 2

## 2024-02-01 ASSESSMENT — ENCOUNTER SYMPTOMS: OCCASIONAL FEELINGS OF UNSTEADINESS: 0

## 2024-02-01 ASSESSMENT — PATIENT HEALTH QUESTIONNAIRE - PHQ9
2. FEELING DOWN, DEPRESSED OR HOPELESS: NOT AT ALL
1. LITTLE INTEREST OR PLEASURE IN DOING THINGS: NOT AT ALL
SUM OF ALL RESPONSES TO PHQ9 QUESTIONS 1 AND 2: 0

## 2024-02-01 NOTE — PROGRESS NOTES
[facility]  Department of Neurological Surgery  Post Operative Patient Visit      History of Present Illness:  Katlyn Jeff is a 59 y.o. year old female who presents to the spine clinic in a post operative visit.     They are status post decompressive lumbar laminectomy at L4-5 and L5-S1.  She was doing extremely well until approximately 4 weeks after her surgery.  She developed a small compression fracture at L5.  It has since stabilized.  The back pain has improved dramatically.  The tailbone pain went away with the surgery and the leg pain also went away with the surgery.  She continues to wear her brace.  I am going to let her start weaning out of the brace mid February.  I am going to get her started in aqua therapy.  She has access to a pool so she can continue to do those exercises after she completes her aqua therapy.  We are going to see her back sometime in early March with another set of standing x-rays.    14/14 systems reviewed and negative other than what is listed in the history of present illness    Patient Active Problem List   Diagnosis    Bilateral myopia    Bilateral presbyopia    Diabetes mellitus type 2 without retinopathy (CMS/HCC)    Neurogenic claudication due to lumbar spinal stenosis    Idiopathic scoliosis and kyphoscoliosis    Spondylolisthesis, grade 1    Scoliosis, congenital    Status post lumbar surgery    Closed compression fracture of fifth lumbar vertebra (CMS/HCC)     Past Medical History:   Diagnosis Date    Arthritis     Dysfunctional uterine bleeding     GERD (gastroesophageal reflux disease)     Hepatitis C     History of blood transfusion     HPV in female     Hypertension     Nephrolithiasis     Personal history of other diseases of the circulatory system     History of hypertension    Personal history of other diseases of the musculoskeletal system and connective tissue     History of scoliosis    Personal history of other diseases of the  musculoskeletal system and connective tissue     History of arthritis    Personal history of other endocrine, nutritional and metabolic disease     History of hypercholesterolemia    Personal history of other infectious and parasitic diseases     History of hepatitis C virus infection    Personal history of other mental and behavioral disorders     History of depression    Scoliosis     Type 2 diabetes mellitus without complications (CMS/HCC)     Diabetes type 2, controlled    Vision loss      Past Surgical History:   Procedure Laterality Date    CHOLECYSTECTOMY      COLONOSCOPY      OTHER SURGICAL HISTORY  10/17/2022    Ankle surgery    OTHER SURGICAL HISTORY  10/17/2022    Cervical vertebral fusion    SPINAL FUSION      TONSILLECTOMY      UPPER GASTROINTESTINAL ENDOSCOPY       Social History     Tobacco Use    Smoking status: Never    Smokeless tobacco: Never   Substance Use Topics    Alcohol use: Yes     Comment: Rarely     family history includes Chronic primary angle-closure of right eye, moderate stage in her mother; Diabetes in her mother and another family member; Hypertension in her mother; Stomach cancer in her father, maternal grandmother, and paternal grandmother.    Current Outpatient Medications:     acetaminophen (Tylenol) 325 mg tablet, Take 3 tablets (975 mg) by mouth every 8 hours if needed for mild pain (1 - 3) (post op pain)., Disp: , Rfl:     amLODIPine (Norvasc) 10 mg tablet, Take 1 tablet (10 mg) by mouth once daily., Disp: , Rfl:     cyclobenzaprine (Flexeril) 10 mg tablet, Take 1 tablet (10 mg) by mouth 3 times a day as needed for muscle spasms (post op pain)., Disp: 30 tablet, Rfl: 1    docusate sodium (Colace) 100 mg capsule, Take 1 capsule (100 mg) by mouth 2 times a day as needed for constipation., Disp: , Rfl:     DULoxetine (Cymbalta) 60 mg DR capsule, Take 1 capsule (60 mg) by mouth once daily., Disp: , Rfl:     FreeStyle Lite Strips strip, USE TO TEST EVERY DAY, Disp: , Rfl:      gabapentin (Neurontin) 300 mg capsule, Take 1 capsule (300 mg) by mouth 2 times a day., Disp: , Rfl:     ibuprofen 800 mg tablet, Take 1 tablet (800 mg) by mouth every 8 hours if needed for mild pain (1 - 3). Do not start before December 4, 2023., Disp: , Rfl:     montelukast (Singulair) 10 mg tablet, Take 1 tablet (10 mg) by mouth once daily at bedtime., Disp: , Rfl:     oxyCODONE (Roxicodone) 5 mg immediate release tablet, Take 1 tablet (5 mg) by mouth every 6 hours if needed for moderate pain (4 - 6) or severe pain (7 - 10) (post op pain)., Disp: 28 tablet, Rfl: 0    Ozempic 2 mg/dose (8 mg/3 mL) pen injector, Inject 2 mg under the skin 1 (one) time per week., Disp: , Rfl:     potassium chloride CR 10 mEq ER tablet, Take 1 tablet (10 mEq) by mouth once daily., Disp: , Rfl:     valsartan-hydrochlorothiazide (Diovan-HCT) 160-12.5 mg tablet, 1 tablet once daily., Disp: , Rfl:     zolpidem CR (Ambien CR) 12.5 mg ER tablet, Take 1 tablet (12.5 mg) by mouth as needed at bedtime for sleep. Do not crush, chew, or split., Disp: , Rfl:   Allergies   Allergen Reactions    Adhesive Tape-Silicones Unknown    Aloe Vera Latex Gloves [Gloves, Latex With Aloe Vera] Unknown     Latex gloves     Codeine Unknown    Macrolide Antibiotics Unknown       Physical Examination:      Her incisions well-healed.  She has an area just lateral to the SI joint on the right side that is tender.  It is right where the brace is pushing on her low back.  I suspect it will improve when she weans out of the brace.  Her posture is excellent today.    Results  I personally reviewed and interpreted the imaging results which included plain x-rays of the lumbar spine that show that the fracture has stabilized and she has not collapsed any further.  I can also see that the large spur osteophytes on the right side seem to be healing as well.    Assessment/Plan   Katlyn Jeff is a 59 y.o. year old female who presents to the spine clinic in a post operative  visit. Since surgery they are doing reasonably well despite the setback of the small compression fracture at L5.  She is going to start her water therapy and increase her activities.  She will come back and see me sometime in early March.  We will do another set of standing x-rays at that time.      The above clinical summary has been dictated with voice recognition software. It has not been proofread for grammatical errors, typographical mistakes, or other semantic inconsistencies.    Thank you for visiting our office today. It was our pleasure to take part in your healthcare.     Do not hesitate to call with any questions regarding your plan of care after leaving. My office can be reached at [phone] M-F 8am-4pm.     To clinicians, thank you very much for this kind referral. It is a privilege to partner with you in the care of your patients. My office would be delighted to assist you with any further consultations or with questions regarding the plan of care outlined. Do not hesitate to call the office or contact me directly.     Sincerely,    Saji Echavarria MD, FAANS, FACS  Board Certified Neurological Surgeon  , Department of Neurological Surgery  [facility] [employer]    [facility]  [address]  [facility] [address]  [place] [address]    [facility]  [address]  [address]  [place] [address]    Phone: [phone]  Fax: [phone]

## 2024-02-22 ENCOUNTER — APPOINTMENT (OUTPATIENT)
Dept: NEUROSURGERY | Facility: CLINIC | Age: 59
End: 2024-02-22
Payer: COMMERCIAL

## 2024-03-26 ENCOUNTER — OFFICE VISIT (OUTPATIENT)
Dept: NEUROSURGERY | Facility: CLINIC | Age: 59
End: 2024-03-26
Payer: COMMERCIAL

## 2024-03-26 VITALS
TEMPERATURE: 97 F | BODY MASS INDEX: 33.88 KG/M2 | HEART RATE: 97 BPM | WEIGHT: 191.2 LBS | SYSTOLIC BLOOD PRESSURE: 116 MMHG | HEIGHT: 63 IN | RESPIRATION RATE: 20 BRPM | DIASTOLIC BLOOD PRESSURE: 68 MMHG

## 2024-03-26 DIAGNOSIS — Z98.890 STATUS POST LUMBAR SURGERY: Primary | ICD-10-CM

## 2024-03-26 DIAGNOSIS — Q67.5 SCOLIOSIS, CONGENITAL: ICD-10-CM

## 2024-03-26 DIAGNOSIS — S32.050D CLOSED COMPRESSION FRACTURE OF L5 LUMBAR VERTEBRA WITH ROUTINE HEALING, SUBSEQUENT ENCOUNTER: ICD-10-CM

## 2024-03-26 PROCEDURE — 99024 POSTOP FOLLOW-UP VISIT: CPT | Performed by: NEUROLOGICAL SURGERY

## 2024-03-26 PROCEDURE — 3078F DIAST BP <80 MM HG: CPT | Performed by: NEUROLOGICAL SURGERY

## 2024-03-26 PROCEDURE — 3074F SYST BP LT 130 MM HG: CPT | Performed by: NEUROLOGICAL SURGERY

## 2024-03-26 PROCEDURE — 1036F TOBACCO NON-USER: CPT | Performed by: NEUROLOGICAL SURGERY

## 2024-03-26 ASSESSMENT — LIFESTYLE VARIABLES: HOW OFTEN DO YOU HAVE A DRINK CONTAINING ALCOHOL: MONTHLY OR LESS

## 2024-03-26 ASSESSMENT — PAIN SCALES - GENERAL: PAINLEVEL: 1

## 2024-03-26 NOTE — PROGRESS NOTES
[facility]  Department of Neurological Surgery  Post Operative Patient Visit      History of Present Illness:  Katlyn Jeff is a 59 y.o. year old female who presents to the spine clinic in a post operative visit.     They are status post decompressive lumbar laminectomy L4-5 and L5-S1.  This was complicated by a compression fracture at L5.  She has healed up well.  She is doing well in aqua therapy.  She continues to make good progress.  I would like to see her being able to tread water in aqua therapy before she graduates.  I told her I did be happy to send her to land-based therapy after she makes that milestone.  She will let us know and we can write that prescription.  I was clear to her that the goal of more therapy is a teach her techniques that she will be able to utilize for the rest of her life.  And she can then follow-up with us on an as-needed basis.    14/14 systems reviewed and negative other than what is listed in the history of present illness    Patient Active Problem List   Diagnosis    Bilateral myopia    Bilateral presbyopia    Diabetes mellitus type 2 without retinopathy (CMS/HCC)    Neurogenic claudication due to lumbar spinal stenosis    Idiopathic scoliosis and kyphoscoliosis    Spondylolisthesis, grade 1    Scoliosis, congenital    Status post lumbar surgery    Closed compression fracture of fifth lumbar vertebra (CMS/HCC)     Past Medical History:   Diagnosis Date    Arthritis     Dysfunctional uterine bleeding     GERD (gastroesophageal reflux disease)     Hepatitis C     History of blood transfusion     HPV in female     Hypertension     Nephrolithiasis     Personal history of other diseases of the circulatory system     History of hypertension    Personal history of other diseases of the musculoskeletal system and connective tissue     History of scoliosis    Personal history of other diseases of the musculoskeletal system and connective tissue     History of  arthritis    Personal history of other endocrine, nutritional and metabolic disease     History of hypercholesterolemia    Personal history of other infectious and parasitic diseases     History of hepatitis C virus infection    Personal history of other mental and behavioral disorders     History of depression    Scoliosis     Type 2 diabetes mellitus without complications (CMS/HCC)     Diabetes type 2, controlled    Vision loss      Past Surgical History:   Procedure Laterality Date    CHOLECYSTECTOMY      COLONOSCOPY      OTHER SURGICAL HISTORY  10/17/2022    Ankle surgery    OTHER SURGICAL HISTORY  10/17/2022    Cervical vertebral fusion    SPINAL FUSION      TONSILLECTOMY      UPPER GASTROINTESTINAL ENDOSCOPY       Social History     Tobacco Use    Smoking status: Never    Smokeless tobacco: Never   Substance Use Topics    Alcohol use: Yes     Comment: Rarely     family history includes Chronic primary angle-closure of right eye, moderate stage in her mother; Diabetes in her mother and another family member; Hypertension in her mother; Stomach cancer in her father, maternal grandmother, and paternal grandmother.    Current Outpatient Medications:     amLODIPine (Norvasc) 10 mg tablet, Take 1 tablet (10 mg) by mouth once daily., Disp: , Rfl:     DULoxetine (Cymbalta) 60 mg DR capsule, Take 1 capsule (60 mg) by mouth once daily., Disp: , Rfl:     FreeStyle Lite Strips strip, USE TO TEST EVERY DAY, Disp: , Rfl:     ibuprofen 800 mg tablet, Take 1 tablet (800 mg) by mouth every 8 hours if needed for mild pain (1 - 3). Do not start before December 4, 2023., Disp: , Rfl:     montelukast (Singulair) 10 mg tablet, Take 1 tablet (10 mg) by mouth once daily at bedtime., Disp: , Rfl:     Ozempic 2 mg/dose (8 mg/3 mL) pen injector, Inject 2 mg under the skin 1 (one) time per week., Disp: , Rfl:     potassium chloride CR 10 mEq ER tablet, Take 1 tablet (10 mEq) by mouth once daily., Disp: , Rfl:     zolpidem CR (Ambien CR)  12.5 mg ER tablet, Take 1 tablet (12.5 mg) by mouth as needed at bedtime for sleep. Do not crush, chew, or split., Disp: , Rfl:     gabapentin (Neurontin) 300 mg capsule, Take 1 capsule (300 mg) by mouth 2 times a day., Disp: , Rfl:     valsartan-hydrochlorothiazide (Diovan-HCT) 160-12.5 mg tablet, 1 tablet once daily., Disp: , Rfl:   Allergies   Allergen Reactions    Adhesive Tape-Silicones Unknown    Aloe Vera Latex Gloves [Gloves, Latex With Aloe Vera] Unknown     Latex gloves     Codeine Unknown    Macrolide Antibiotics Unknown       Physical Examination:    On exam today she stands well she is actually bending to her shoes reasonably well without discomfort.  Her gait is still a bit wide-based but I think that has more to do with her rigid spine than with the new decompression.    Results  I personally reviewed and interpreted the imaging results which included :she did not get her x-rays today but she will get them within the next couple weeks and we will have them as our baseline for what the fracture looks like after it had completely healed.    Assessment/Plan   Katlyn Jeff is a 59 y.o. year old female who presents to the spine clinic in a post operative visit. Since surgery they are doing reasonably well and making every milestone as if the fracture had not occurred.  She is going to follow-up with us on an as-needed basis.      The above clinical summary has been dictated with voice recognition software. It has not been proofread for grammatical errors, typographical mistakes, or other semantic inconsistencies.    Thank you for visiting our office today. It was our pleasure to take part in your healthcare.     Do not hesitate to call with any questions regarding your plan of care after leaving. My office can be reached at [phone] M-W 8am-4pm.     To clinicians, thank you very much for this kind referral. It is a privilege to partner with you in the care of your patients. My office would be  delighted to assist you with any further consultations or with questions regarding the plan of care outlined. Do not hesitate to call the office or contact me directly.     Sincerely,    Saji Echavarria MD, FAANS, FACS  Board Certified Neurological Surgeon  , Department of Neurological Surgery  [facility] [employer] [facility] [employer]    [facility]  [address]  [facility] [address]  [address]  [address] [facility]  [address] [place] [address]    Phone: [phone]  Fax: [phone]

## 2024-04-30 DIAGNOSIS — M48.062 NEUROGENIC CLAUDICATION DUE TO LUMBAR SPINAL STENOSIS: Primary | ICD-10-CM

## 2024-04-30 DIAGNOSIS — S32.050D CLOSED COMPRESSION FRACTURE OF L5 LUMBAR VERTEBRA WITH ROUTINE HEALING, SUBSEQUENT ENCOUNTER: ICD-10-CM

## 2024-05-15 ENCOUNTER — HOSPITAL ENCOUNTER (OUTPATIENT)
Dept: RADIOLOGY | Facility: HOSPITAL | Age: 59
Discharge: HOME | End: 2024-05-15
Payer: COMMERCIAL

## 2024-05-15 DIAGNOSIS — M41.20 IDIOPATHIC SCOLIOSIS AND KYPHOSCOLIOSIS: ICD-10-CM

## 2024-05-15 DIAGNOSIS — M48.062 NEUROGENIC CLAUDICATION DUE TO LUMBAR SPINAL STENOSIS: ICD-10-CM

## 2024-05-15 DIAGNOSIS — S32.050D CLOSED COMPRESSION FRACTURE OF L5 LUMBAR VERTEBRA WITH ROUTINE HEALING, SUBSEQUENT ENCOUNTER: ICD-10-CM

## 2024-05-15 DIAGNOSIS — Z98.890 STATUS POST LUMBAR SURGERY: ICD-10-CM

## 2024-05-15 DIAGNOSIS — K74.60 CIRRHOSIS OF LIVER WITHOUT ASCITES, UNSPECIFIED HEPATIC CIRRHOSIS TYPE (MULTI): ICD-10-CM

## 2024-05-15 PROCEDURE — 72100 X-RAY EXAM L-S SPINE 2/3 VWS: CPT

## 2024-05-15 PROCEDURE — 72100 X-RAY EXAM L-S SPINE 2/3 VWS: CPT | Performed by: RADIOLOGY

## 2024-05-15 PROCEDURE — 76705 ECHO EXAM OF ABDOMEN: CPT | Performed by: RADIOLOGY

## 2024-05-15 PROCEDURE — 76705 ECHO EXAM OF ABDOMEN: CPT

## 2024-06-12 DIAGNOSIS — Z98.890 STATUS POST LUMBAR SURGERY: ICD-10-CM

## 2024-06-12 DIAGNOSIS — Q67.5 SCOLIOSIS, CONGENITAL: Primary | ICD-10-CM

## 2024-06-12 DIAGNOSIS — M48.062 NEUROGENIC CLAUDICATION DUE TO LUMBAR SPINAL STENOSIS: ICD-10-CM

## 2024-06-14 DIAGNOSIS — Q67.5 SCOLIOSIS, CONGENITAL: Primary | ICD-10-CM

## 2024-06-21 ENCOUNTER — HOSPITAL ENCOUNTER (OUTPATIENT)
Dept: RADIOLOGY | Facility: HOSPITAL | Age: 59
Discharge: HOME | End: 2024-06-21
Payer: COMMERCIAL

## 2024-06-21 DIAGNOSIS — Q67.5 SCOLIOSIS, CONGENITAL: ICD-10-CM

## 2024-06-21 PROCEDURE — 72082 X-RAY EXAM ENTIRE SPI 2/3 VW: CPT

## 2024-09-09 ENCOUNTER — APPOINTMENT (OUTPATIENT)
Dept: OPHTHALMOLOGY | Facility: CLINIC | Age: 59
End: 2024-09-09
Payer: COMMERCIAL

## 2024-09-09 DIAGNOSIS — H52.4 BILATERAL PRESBYOPIA: ICD-10-CM

## 2024-09-09 DIAGNOSIS — H52.13 BILATERAL MYOPIA: ICD-10-CM

## 2024-09-09 DIAGNOSIS — H25.813 COMBINED FORMS OF AGE-RELATED CATARACT OF BOTH EYES: ICD-10-CM

## 2024-09-09 DIAGNOSIS — E11.9 DIABETES MELLITUS TYPE 2 WITHOUT RETINOPATHY (MULTI): Primary | ICD-10-CM

## 2024-09-09 PROCEDURE — 92014 COMPRE OPH EXAM EST PT 1/>: CPT | Performed by: OPTOMETRIST

## 2024-09-09 PROCEDURE — 92015 DETERMINE REFRACTIVE STATE: CPT | Performed by: OPTOMETRIST

## 2024-09-09 ASSESSMENT — ENCOUNTER SYMPTOMS
PSYCHIATRIC NEGATIVE: 0
CONSTITUTIONAL NEGATIVE: 0
EYES NEGATIVE: 0
CARDIOVASCULAR NEGATIVE: 0
RESPIRATORY NEGATIVE: 0
NEUROLOGICAL NEGATIVE: 0
HEMATOLOGIC/LYMPHATIC NEGATIVE: 0
GASTROINTESTINAL NEGATIVE: 0
ALLERGIC/IMMUNOLOGIC NEGATIVE: 0
MUSCULOSKELETAL NEGATIVE: 0
ENDOCRINE NEGATIVE: 1

## 2024-09-09 ASSESSMENT — REFRACTION_MANIFEST
OD_AXIS: 105
OS_CYLINDER: -0.75
OS_SPHERE: -1.50
OS_AXIS: 075
OD_CYLINDER: -1.00
OD_SPHERE: -1.00
OS_ADD: +2.50
OD_ADD: +2.50

## 2024-09-09 ASSESSMENT — REFRACTION_WEARINGRX
OS_SPHERE: -1.75
OD_CYLINDER: SPHERE
OS_AXIS: 075
OD_SPHERE: -2.00
OD_ADD: +2.50
OS_CYLINDER: -0.75
OS_ADD: +2.50

## 2024-09-09 ASSESSMENT — CONF VISUAL FIELD
OS_SUPERIOR_NASAL_RESTRICTION: 0
OD_SUPERIOR_TEMPORAL_RESTRICTION: 0
OD_INFERIOR_NASAL_RESTRICTION: 0
OS_INFERIOR_NASAL_RESTRICTION: 0
OS_NORMAL: 1
OD_SUPERIOR_NASAL_RESTRICTION: 0
OS_SUPERIOR_TEMPORAL_RESTRICTION: 0
OD_NORMAL: 1
METHOD: COUNTING FINGERS
OS_INFERIOR_TEMPORAL_RESTRICTION: 0
OD_INFERIOR_TEMPORAL_RESTRICTION: 0

## 2024-09-09 ASSESSMENT — VISUAL ACUITY
METHOD: SNELLEN - LINEAR
OS_CC: 20/20
OD_CC+: -2
OD_CC: 20/20
CORRECTION_TYPE: GLASSES

## 2024-09-09 ASSESSMENT — EXTERNAL EXAM - LEFT EYE: OS_EXAM: NORMAL

## 2024-09-09 ASSESSMENT — TONOMETRY
OD_IOP_MMHG: 18
OS_IOP_MMHG: 17
IOP_METHOD: GOLDMANN APPLANATION

## 2024-09-09 ASSESSMENT — CUP TO DISC RATIO
OS_RATIO: .3
OD_RATIO: .3

## 2024-09-09 ASSESSMENT — SLIT LAMP EXAM - LIDS
COMMENTS: GOOD POSITION
COMMENTS: GOOD POSITION

## 2024-09-09 ASSESSMENT — EXTERNAL EXAM - RIGHT EYE: OD_EXAM: NORMAL

## 2024-09-09 NOTE — PROGRESS NOTES
DMII no retinopathy. I discussed the value of good blood sugar control under your management and annual eye exams.     A spectacle prescription was dispensed to be used as needed.     Allergic conjunctivitis is mild and can be managed with OTC drops.     Mild nuclear sclerosis OU. Will monitor as well.     The patient was asked to return to our clinic in one year or sooner if ocular or vision changes occur

## 2025-09-15 ENCOUNTER — APPOINTMENT (OUTPATIENT)
Dept: OPHTHALMOLOGY | Facility: CLINIC | Age: 60
End: 2025-09-15
Payer: COMMERCIAL

## (undated) DEVICE — TIP, SUCTION, FRAZIER, 12 FR

## (undated) DEVICE — COVER, TABLE, 54X90

## (undated) DEVICE — STRIP, SKIN CLOSURE, STERI STRIP, REINFORCED, 0.5 X 4 IN

## (undated) DEVICE — SPONGE, NEURO, 1 X 1 IN, STERILE

## (undated) DEVICE — KIT, PATIENT CARE, JACKSON TABLE W/PRONE-SAFE HEADREST

## (undated) DEVICE — SYRINGE, 10 CC, LUER LOCK

## (undated) DEVICE — SPONGE, HEMOSTATIC, GELATIN, SURGIFOAM, 8 X 12.5 CM X 10 MM

## (undated) DEVICE — DRESSING, ABDOMINAL, TENDERSORB, 8 X 7-1/2 IN, STERILE

## (undated) DEVICE — POWDER, SURGIFOAM, GELATIN, 1 GRAM

## (undated) DEVICE — Device

## (undated) DEVICE — SPONGE, NEURO, 3/4 X 3/4IN, STERILE, LF

## (undated) DEVICE — CUP, MEDICINE, GRADUATED, 2 OZ, PLASTIC, DISP, LF

## (undated) DEVICE — SPONGE, NEURO, 1/2 X 1/2IN, STERILE, LF

## (undated) DEVICE — TUBING, CLEAR N-COND, 5MM X 10, LF

## (undated) DEVICE — CATHETER, IV, ANGIOCATH, 14 G X 1.88 IN, FEP POLYMER

## (undated) DEVICE — BASIN, EMESIS, STANDARD, STERILE

## (undated) DEVICE — BAG, BANDED, 36IN X 28IN

## (undated) DEVICE — CATHETER KIT, RADIAL ARTLINE, 20G X 1 3/4

## (undated) DEVICE — COUNTER, NEEDLE, FOAM BLOCK, W/MAGNET, 20 COUNT

## (undated) DEVICE — CARE KIT, PATIENT, LPV ARM CRADLE RWF, WILSON FRAME

## (undated) DEVICE — BONE WAX, 2.5 GRAMS

## (undated) DEVICE — SLEEVE, VASO PRESS, CALF GARMENT, MEDIUM, GREEN

## (undated) DEVICE — DRESSING, GAUZE, SPONGE, 12 PLY, CURITY, 4 X 4 IN, RIGID TRAY, STERILE

## (undated) DEVICE — DRAPE, INCISE, ANTIMICROBIAL, IOBAN 2, LARGE, 17 X 23 IN, DISPOSABLE, STERILE

## (undated) DEVICE — IRRIGATION TRAY, SYRINGE, 60 CC, BULB

## (undated) DEVICE — DRESSING, DRAIN SPONGE, EXCILON AMD, 4X4 IN, 6 PLY, ST

## (undated) DEVICE — DRAPE, SHEET, THREE QUARTER, FAN FOLD, 57 X 77 IN

## (undated) DEVICE — APPLICATOR, CHLORAPREP, W/ORANGE TINT, 26ML

## (undated) DEVICE — TIP, ELECTROSURGICAL, 4IN BLADE, MODIFIED, EXTENDED

## (undated) DEVICE — TIP, SUCTION, FRAZIER, 10 FR, DISP

## (undated) DEVICE — WOUND, EVAC, 400ML, POUCHED W/1/8`` TUBE W/TROCAR

## (undated) DEVICE — COAGULATOR, HANDSWITCHING, SUCTION

## (undated) DEVICE — NEEDLE, HYPODERMIC, SAFETYGLIDE, SHIELDING, REGULAR WALL, REGULAR BEVEL, 22 G X 1.5 IN, BLACK HUB

## (undated) DEVICE — DRAPE, INSTRUMENT, W/POUCH, STERI DRAPE, 7 X 11 IN, DISPOSABLE, STERILE

## (undated) DEVICE — DRESSING, TELFA, 3X4

## (undated) DEVICE — MARKER, SKIN, REGULAR TIP, W/FLEXI-RULER

## (undated) DEVICE — CORD, CAUTERY, BIOPOLAR FORCEP, 12FT

## (undated) DEVICE — CATHETER TRAY, SURESTEP, 16FR, URINE METER W/STATLOCK